# Patient Record
Sex: FEMALE | Race: OTHER | NOT HISPANIC OR LATINO
[De-identification: names, ages, dates, MRNs, and addresses within clinical notes are randomized per-mention and may not be internally consistent; named-entity substitution may affect disease eponyms.]

---

## 2017-09-25 ENCOUNTER — APPOINTMENT (OUTPATIENT)
Dept: ALLERGY | Facility: CLINIC | Age: 37
End: 2017-09-25
Payer: COMMERCIAL

## 2017-09-25 VITALS
DIASTOLIC BLOOD PRESSURE: 80 MMHG | WEIGHT: 145 LBS | HEART RATE: 80 BPM | SYSTOLIC BLOOD PRESSURE: 110 MMHG | HEIGHT: 64 IN | BODY MASS INDEX: 24.75 KG/M2 | RESPIRATION RATE: 14 BRPM

## 2017-09-25 PROCEDURE — 95018 ALL TSTG PERQ&IQ DRUGS/BIOL: CPT

## 2017-09-25 PROCEDURE — 95004 PERQ TESTS W/ALRGNC XTRCS: CPT

## 2017-09-25 PROCEDURE — 99204 OFFICE O/P NEW MOD 45 MIN: CPT | Mod: 25

## 2017-10-04 ENCOUNTER — APPOINTMENT (OUTPATIENT)
Dept: ALLERGY | Facility: CLINIC | Age: 37
End: 2017-10-04
Payer: COMMERCIAL

## 2017-10-04 PROCEDURE — 99213 OFFICE O/P EST LOW 20 MIN: CPT | Mod: 25

## 2017-10-04 PROCEDURE — 95018 ALL TSTG PERQ&IQ DRUGS/BIOL: CPT

## 2017-10-04 PROCEDURE — 95024 IQ TESTS W/ALLERGENIC XTRCS: CPT

## 2017-10-05 VITALS
BODY MASS INDEX: 24.75 KG/M2 | RESPIRATION RATE: 14 BRPM | HEART RATE: 72 BPM | HEIGHT: 64 IN | SYSTOLIC BLOOD PRESSURE: 120 MMHG | WEIGHT: 145 LBS | DIASTOLIC BLOOD PRESSURE: 80 MMHG

## 2017-10-18 ENCOUNTER — MESSAGE (OUTPATIENT)
Age: 37
End: 2017-10-18

## 2017-10-24 ENCOUNTER — EMERGENCY (EMERGENCY)
Facility: HOSPITAL | Age: 37
LOS: 1 days | Discharge: LEFT BEFORE DISCHARGE | End: 2017-10-24
Admitting: EMERGENCY MEDICINE

## 2017-10-24 VITALS
TEMPERATURE: 98 F | OXYGEN SATURATION: 98 % | HEART RATE: 77 BPM | SYSTOLIC BLOOD PRESSURE: 110 MMHG | RESPIRATION RATE: 17 BRPM | DIASTOLIC BLOOD PRESSURE: 62 MMHG

## 2017-10-24 NOTE — ED ADULT TRIAGE NOTE - CHIEF COMPLAINT QUOTE
pt states she woke up dizzy this morning. states the dizziness make her feel like she is going to fall.

## 2018-11-05 ENCOUNTER — EMERGENCY (EMERGENCY)
Facility: HOSPITAL | Age: 38
LOS: 1 days | Discharge: HOME | End: 2018-11-05
Attending: EMERGENCY MEDICINE | Admitting: EMERGENCY MEDICINE
Payer: COMMERCIAL

## 2018-11-05 VITALS
SYSTOLIC BLOOD PRESSURE: 101 MMHG | TEMPERATURE: 98 F | DIASTOLIC BLOOD PRESSURE: 46 MMHG | RESPIRATION RATE: 16 BRPM | HEART RATE: 63 BPM | OXYGEN SATURATION: 100 %

## 2018-11-05 VITALS
DIASTOLIC BLOOD PRESSURE: 48 MMHG | SYSTOLIC BLOOD PRESSURE: 107 MMHG | HEART RATE: 62 BPM | TEMPERATURE: 97 F | OXYGEN SATURATION: 100 % | RESPIRATION RATE: 18 BRPM

## 2018-11-05 PROCEDURE — 99284 EMERGENCY DEPT VISIT MOD MDM: CPT | Mod: 25

## 2018-11-05 RX ORDER — ONDANSETRON 8 MG/1
8 TABLET, FILM COATED ORAL ONCE
Qty: 0 | Refills: 0 | Status: COMPLETED | OUTPATIENT
Start: 2018-11-05 | End: 2018-11-05

## 2018-11-05 RX ORDER — SODIUM CHLORIDE 9 MG/ML
1000 INJECTION INTRAMUSCULAR; INTRAVENOUS; SUBCUTANEOUS ONCE
Qty: 0 | Refills: 0 | Status: COMPLETED | OUTPATIENT
Start: 2018-11-05 | End: 2018-11-05

## 2018-11-05 RX ORDER — ACETAMINOPHEN 500 MG
975 TABLET ORAL ONCE
Qty: 0 | Refills: 0 | Status: COMPLETED | OUTPATIENT
Start: 2018-11-05 | End: 2018-11-05

## 2018-11-05 RX ADMIN — Medication 975 MILLIGRAM(S): at 11:29

## 2018-11-05 RX ADMIN — ONDANSETRON 8 MILLIGRAM(S): 8 TABLET, FILM COATED ORAL at 08:49

## 2018-11-05 RX ADMIN — Medication 30 MILLILITER(S): at 08:49

## 2018-11-05 RX ADMIN — SODIUM CHLORIDE 1000 MILLILITER(S): 9 INJECTION INTRAMUSCULAR; INTRAVENOUS; SUBCUTANEOUS at 08:49

## 2018-11-05 RX ADMIN — SODIUM CHLORIDE 1000 MILLILITER(S): 9 INJECTION INTRAMUSCULAR; INTRAVENOUS; SUBCUTANEOUS at 09:56

## 2018-11-05 NOTE — ED PROVIDER NOTE - OBJECTIVE STATEMENT
38y healthy F with surgical hx of  presents with 1 morning of nausea, vomiting, and loose watery diarrhea. No blood in vomit or diarrhea. Pt also c/o vague diffuse abd pain. No localization. Works as physician at [employer]. States drank "holy water in [place]". Denies sick contact. No recent travel

## 2018-11-05 NOTE — ED ADULT NURSE NOTE - NSIMPLEMENTINTERV_GEN_ALL_ED
Implemented All Universal Safety Interventions:  [place] to call system. Call bell, personal items and telephone within reach. Instruct patient to call for assistance. Room bathroom lighting operational. Non-slip footwear when patient is off stretcher. Physically safe environment: no spills, clutter or unnecessary equipment. Stretcher in lowest position, wheels locked, appropriate side rails in place.

## 2018-11-05 NOTE — ED ADULT NURSE NOTE - OBJECTIVE STATEMENT
Patient reports abdominal pain, Nausea/vomiting since 5 am today. Patient denies any diarrhea, blood in stool, dysuria or hematuria. Patient's ucg currently in progress. Patient reports 3 episodes of emesis, patient reports emesis as food ingested. Patient's diastolic hypotensive, systolic normotensive. Patient receiving IVF through #20g inserted into right hand. Patient also given 8 mg IV Zofran and Maalox as per MD order. Patient to be reassessed.

## 2018-11-05 NOTE — ED ADULT NURSE NOTE - CHPI ED NUR SYMPTOMS NEG
no abdominal distension/no blood in stool/no burning urination/no chills/no hematuria/no dysuria/no fever/no diarrhea

## 2020-06-24 ENCOUNTER — APPOINTMENT (OUTPATIENT)
Dept: GASTROENTEROLOGY | Facility: CLINIC | Age: 40
End: 2020-06-24

## 2020-06-26 ENCOUNTER — APPOINTMENT (OUTPATIENT)
Dept: CARDIOLOGY | Facility: CLINIC | Age: 40
End: 2020-06-26
Payer: COMMERCIAL

## 2020-06-26 VITALS
DIASTOLIC BLOOD PRESSURE: 70 MMHG | HEART RATE: 61 BPM | SYSTOLIC BLOOD PRESSURE: 100 MMHG | OXYGEN SATURATION: 99 % | TEMPERATURE: 97.9 F | HEIGHT: 64 IN | BODY MASS INDEX: 23.9 KG/M2 | WEIGHT: 140 LBS

## 2020-06-26 DIAGNOSIS — Z83.438 FAMILY HISTORY OF OTHER DISORDER OF LIPOPROTEIN METABOLISM AND OTHER LIPIDEMIA: ICD-10-CM

## 2020-06-26 DIAGNOSIS — Z71.89 OTHER SPECIFIED COUNSELING: ICD-10-CM

## 2020-06-26 DIAGNOSIS — R07.9 CHEST PAIN, UNSPECIFIED: ICD-10-CM

## 2020-06-26 DIAGNOSIS — R06.00 DYSPNEA, UNSPECIFIED: ICD-10-CM

## 2020-06-26 DIAGNOSIS — R00.2 PALPITATIONS: ICD-10-CM

## 2020-06-26 PROCEDURE — 93000 ELECTROCARDIOGRAM COMPLETE: CPT

## 2020-06-26 PROCEDURE — 93306 TTE W/DOPPLER COMPLETE: CPT

## 2020-06-26 PROCEDURE — 99204 OFFICE O/P NEW MOD 45 MIN: CPT

## 2020-06-26 RX ORDER — FLUTICASONE PROPIONATE 50 UG/1
SPRAY, METERED NASAL
Refills: 0 | Status: DISCONTINUED | COMMUNITY
End: 2020-06-26

## 2020-06-26 NOTE — PHYSICAL EXAM
[General Appearance - Well Developed] : well developed [Normal Appearance] : normal appearance [Well Groomed] : well groomed [General Appearance - Well Nourished] : well nourished [No Deformities] : no deformities [General Appearance - In No Acute Distress] : no acute distress [Normal Conjunctiva] : the conjunctiva exhibited no abnormalities [Eyelids - No Xanthelasma] : the eyelids demonstrated no xanthelasmas [Normal Oral Mucosa] : normal oral mucosa [No Oral Pallor] : no oral pallor [No Oral Cyanosis] : no oral cyanosis [Normal Jugular Venous A Waves Present] : normal jugular venous A waves present [Normal Jugular Venous V Waves Present] : normal jugular venous V waves present [No Jugular Venous Shields A Waves] : no jugular venous shields A waves [Heart Rate And Rhythm] : heart rate and rhythm were normal [Heart Sounds] : normal S1 and S2 [Respiration, Rhythm And Depth] : normal respiratory rhythm and effort [Murmurs] : no murmurs present [Auscultation Breath Sounds / Voice Sounds] : lungs were clear to auscultation bilaterally [Exaggerated Use Of Accessory Muscles For Inspiration] : no accessory muscle use [Abdomen Tenderness] : non-tender [Abdomen Soft] : soft [Abdomen Mass (___ Cm)] : no abdominal mass palpated [Gait - Sufficient For Exercise Testing] : the gait was sufficient for exercise testing [Abnormal Walk] : normal gait [Nail Clubbing] : no clubbing of the fingernails [Cyanosis, Localized] : no localized cyanosis [Petechial Hemorrhages (___cm)] : no petechial hemorrhages [Skin Color & Pigmentation] : normal skin color and pigmentation [No Venous Stasis] : no venous stasis [No Skin Ulcers] : no skin ulcer [] : no rash [Skin Lesions] : no skin lesions [No Xanthoma] : no  xanthoma was observed [Oriented To Time, Place, And Person] : oriented to person, place, and time [Affect] : the affect was normal [No Anxiety] : not feeling anxious [Mood] : the mood was normal

## 2020-06-26 NOTE — PHYSICAL EXAM
[General Appearance - Well Developed] : well developed [Normal Appearance] : normal appearance [Well Groomed] : well groomed [General Appearance - Well Nourished] : well nourished [No Deformities] : no deformities [General Appearance - In No Acute Distress] : no acute distress [Normal Conjunctiva] : the conjunctiva exhibited no abnormalities [Eyelids - No Xanthelasma] : the eyelids demonstrated no xanthelasmas [Normal Oral Mucosa] : normal oral mucosa [No Oral Pallor] : no oral pallor [No Oral Cyanosis] : no oral cyanosis [Normal Jugular Venous A Waves Present] : normal jugular venous A waves present [Normal Jugular Venous V Waves Present] : normal jugular venous V waves present [No Jugular Venous Shields A Waves] : no jugular venous shields A waves [Heart Sounds] : normal S1 and S2 [Heart Rate And Rhythm] : heart rate and rhythm were normal [Respiration, Rhythm And Depth] : normal respiratory rhythm and effort [Murmurs] : no murmurs present [Exaggerated Use Of Accessory Muscles For Inspiration] : no accessory muscle use [Auscultation Breath Sounds / Voice Sounds] : lungs were clear to auscultation bilaterally [Abdomen Mass (___ Cm)] : no abdominal mass palpated [Abdomen Soft] : soft [Abdomen Tenderness] : non-tender [Abnormal Walk] : normal gait [Gait - Sufficient For Exercise Testing] : the gait was sufficient for exercise testing [Nail Clubbing] : no clubbing of the fingernails [Petechial Hemorrhages (___cm)] : no petechial hemorrhages [Cyanosis, Localized] : no localized cyanosis [Skin Color & Pigmentation] : normal skin color and pigmentation [No Venous Stasis] : no venous stasis [] : no rash [No Skin Ulcers] : no skin ulcer [Skin Lesions] : no skin lesions [No Xanthoma] : no  xanthoma was observed [Oriented To Time, Place, And Person] : oriented to person, place, and time [No Anxiety] : not feeling anxious [Mood] : the mood was normal [Affect] : the affect was normal

## 2020-06-26 NOTE — HISTORY OF PRESENT ILLNESS
[FreeTextEntry1] : Eric is a 41yo female with no known PMH presents today for evaluation of chest pain. She reports today that she has been experiencing chest pain since March. 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 Patient states that she experiences  the chest pain daily and it is intermittent. It is not worse with exertion. She has also been experiencing palpitations and dyspnea. These symptoms of chest pain, dyspnea and palpitations do always occur together. She has checked her vital signs at home and states her heart rate and pulse ox are always wnl. Chest pain has not improved or worsened since March.

## 2020-07-02 LAB
ALBUMIN SERPL ELPH-MCNC: 4.8 G/DL
ALP BLD-CCNC: 46 U/L
ALT SERPL-CCNC: 24 U/L
ANION GAP SERPL CALC-SCNC: 14 MMOL/L
AST SERPL-CCNC: 23 U/L
BASOPHILS # BLD AUTO: 0.02 K/UL
BASOPHILS NFR BLD AUTO: 0.3 %
BILIRUB SERPL-MCNC: 0.4 MG/DL
BUN SERPL-MCNC: 7 MG/DL
CALCIUM SERPL-MCNC: 10 MG/DL
CHLORIDE SERPL-SCNC: 104 MMOL/L
CHOLEST SERPL-MCNC: 208 MG/DL
CHOLEST/HDLC SERPL: 3.3 RATIO
CO2 SERPL-SCNC: 26 MMOL/L
CREAT SERPL-MCNC: 0.81 MG/DL
DEPRECATED D DIMER PPP IA-ACNC: <150 NG/ML DDU
EOSINOPHIL # BLD AUTO: 0.11 K/UL
EOSINOPHIL NFR BLD AUTO: 1.8 %
ESTIMATED AVERAGE GLUCOSE: 100 MG/DL
GLUCOSE SERPL-MCNC: 103 MG/DL
HBA1C MFR BLD HPLC: 5.1 %
HCT VFR BLD CALC: 41.2 %
HDLC SERPL-MCNC: 62 MG/DL
HGB BLD-MCNC: 13.4 G/DL
IMM GRANULOCYTES NFR BLD AUTO: 0.3 %
LDLC SERPL CALC-MCNC: 122 MG/DL
LYMPHOCYTES # BLD AUTO: 2.48 K/UL
LYMPHOCYTES NFR BLD AUTO: 41.1 %
MAN DIFF?: NORMAL
MCHC RBC-ENTMCNC: 29.8 PG
MCHC RBC-ENTMCNC: 32.5 GM/DL
MCV RBC AUTO: 91.8 FL
MONOCYTES # BLD AUTO: 0.33 K/UL
MONOCYTES NFR BLD AUTO: 5.5 %
NEUTROPHILS # BLD AUTO: 3.08 K/UL
NEUTROPHILS NFR BLD AUTO: 51 %
PLATELET # BLD AUTO: 235 K/UL
POTASSIUM SERPL-SCNC: 4.5 MMOL/L
PROT SERPL-MCNC: 7.4 G/DL
RBC # BLD: 4.49 M/UL
RBC # FLD: 13.4 %
SARS-COV-2 IGG SERPL IA-ACNC: 31.8 INDEX
SARS-COV-2 IGG SERPL QL IA: POSITIVE
SODIUM SERPL-SCNC: 143 MMOL/L
T4 FREE SERPL-MCNC: 1.2 NG/DL
TRIGL SERPL-MCNC: 116 MG/DL
TROPONIN-T, HIGH SENSITIVITY: <6 NG/L
TSH SERPL-ACNC: 1.5 UIU/ML
WBC # FLD AUTO: 6.04 K/UL

## 2020-07-08 ENCOUNTER — NON-APPOINTMENT (OUTPATIENT)
Age: 40
End: 2020-07-08

## 2020-07-21 PROCEDURE — 93224 XTRNL ECG REC UP TO 48 HRS: CPT

## 2020-08-05 ENCOUNTER — APPOINTMENT (OUTPATIENT)
Dept: CARDIOLOGY | Facility: CLINIC | Age: 40
End: 2020-08-05

## 2020-08-05 ENCOUNTER — RESULT REVIEW (OUTPATIENT)
Age: 40
End: 2020-08-05

## 2020-09-22 ENCOUNTER — APPOINTMENT (OUTPATIENT)
Dept: CARDIOLOGY | Facility: CLINIC | Age: 40
End: 2020-09-22
Payer: COMMERCIAL

## 2020-09-22 PROCEDURE — 93351 STRESS TTE COMPLETE: CPT

## 2020-10-25 ENCOUNTER — EMERGENCY (EMERGENCY)
Facility: HOSPITAL | Age: 40
LOS: 1 days | Discharge: HOME | End: 2020-10-25
Attending: EMERGENCY MEDICINE | Admitting: EMERGENCY MEDICINE
Payer: COMMERCIAL

## 2020-10-25 VITALS
RESPIRATION RATE: 18 BRPM | SYSTOLIC BLOOD PRESSURE: 97 MMHG | DIASTOLIC BLOOD PRESSURE: 49 MMHG | HEIGHT: 65 IN | OXYGEN SATURATION: 100 % | HEART RATE: 64 BPM | TEMPERATURE: 98 F

## 2020-10-25 VITALS — HEART RATE: 65 BPM | OXYGEN SATURATION: 100 % | RESPIRATION RATE: 20 BRPM | TEMPERATURE: 98 F

## 2020-10-25 LAB
ALBUMIN SERPL ELPH-MCNC: 4.1 G/DL — SIGNIFICANT CHANGE UP (ref 3.3–5)
ALP SERPL-CCNC: 42 U/L — SIGNIFICANT CHANGE UP (ref 40–120)
ALT FLD-CCNC: 11 U/L — SIGNIFICANT CHANGE UP (ref 4–33)
ANION GAP SERPL CALC-SCNC: 9 MMO/L — SIGNIFICANT CHANGE UP (ref 7–14)
AST SERPL-CCNC: 23 U/L — SIGNIFICANT CHANGE UP (ref 4–32)
BASOPHILS # BLD AUTO: 0.02 K/UL — SIGNIFICANT CHANGE UP (ref 0–0.2)
BASOPHILS NFR BLD AUTO: 0.3 % — SIGNIFICANT CHANGE UP (ref 0–2)
BILIRUB SERPL-MCNC: 0.3 MG/DL — SIGNIFICANT CHANGE UP (ref 0.2–1.2)
BUN SERPL-MCNC: 8 MG/DL — SIGNIFICANT CHANGE UP (ref 7–23)
CALCIUM SERPL-MCNC: 8.7 MG/DL — SIGNIFICANT CHANGE UP (ref 8.4–10.5)
CHLORIDE SERPL-SCNC: 105 MMOL/L — SIGNIFICANT CHANGE UP (ref 98–107)
CO2 SERPL-SCNC: 26 MMOL/L — SIGNIFICANT CHANGE UP (ref 22–31)
CREAT SERPL-MCNC: 0.64 MG/DL — SIGNIFICANT CHANGE UP (ref 0.5–1.3)
EOSINOPHIL # BLD AUTO: 0.15 K/UL — SIGNIFICANT CHANGE UP (ref 0–0.5)
EOSINOPHIL NFR BLD AUTO: 2.4 % — SIGNIFICANT CHANGE UP (ref 0–6)
GLUCOSE SERPL-MCNC: 101 MG/DL — HIGH (ref 70–99)
HCG SERPL-ACNC: < 5 MIU/ML — SIGNIFICANT CHANGE UP
HCT VFR BLD CALC: 35.2 % — SIGNIFICANT CHANGE UP (ref 34.5–45)
HGB BLD-MCNC: 11.8 G/DL — SIGNIFICANT CHANGE UP (ref 11.5–15.5)
IMM GRANULOCYTES NFR BLD AUTO: 0.2 % — SIGNIFICANT CHANGE UP (ref 0–1.5)
LYMPHOCYTES # BLD AUTO: 2.63 K/UL — SIGNIFICANT CHANGE UP (ref 1–3.3)
LYMPHOCYTES # BLD AUTO: 42.1 % — SIGNIFICANT CHANGE UP (ref 13–44)
MCHC RBC-ENTMCNC: 29.1 PG — SIGNIFICANT CHANGE UP (ref 27–34)
MCHC RBC-ENTMCNC: 33.5 % — SIGNIFICANT CHANGE UP (ref 32–36)
MCV RBC AUTO: 86.9 FL — SIGNIFICANT CHANGE UP (ref 80–100)
MONOCYTES # BLD AUTO: 0.46 K/UL — SIGNIFICANT CHANGE UP (ref 0–0.9)
MONOCYTES NFR BLD AUTO: 7.4 % — SIGNIFICANT CHANGE UP (ref 2–14)
NEUTROPHILS # BLD AUTO: 2.98 K/UL — SIGNIFICANT CHANGE UP (ref 1.8–7.4)
NEUTROPHILS NFR BLD AUTO: 47.6 % — SIGNIFICANT CHANGE UP (ref 43–77)
NRBC # FLD: 0 K/UL — SIGNIFICANT CHANGE UP (ref 0–0)
PLATELET # BLD AUTO: 233 K/UL — SIGNIFICANT CHANGE UP (ref 150–400)
PMV BLD: 10.6 FL — SIGNIFICANT CHANGE UP (ref 7–13)
POTASSIUM SERPL-MCNC: 4 MMOL/L — SIGNIFICANT CHANGE UP (ref 3.5–5.3)
POTASSIUM SERPL-SCNC: 4 MMOL/L — SIGNIFICANT CHANGE UP (ref 3.5–5.3)
PROT SERPL-MCNC: 6.6 G/DL — SIGNIFICANT CHANGE UP (ref 6–8.3)
RBC # BLD: 4.05 M/UL — SIGNIFICANT CHANGE UP (ref 3.8–5.2)
RBC # FLD: 12.6 % — SIGNIFICANT CHANGE UP (ref 10.3–14.5)
SODIUM SERPL-SCNC: 140 MMOL/L — SIGNIFICANT CHANGE UP (ref 135–145)
TROPONIN T, HIGH SENSITIVITY: < 6 NG/L — SIGNIFICANT CHANGE UP (ref ?–14)
WBC # BLD: 6.25 K/UL — SIGNIFICANT CHANGE UP (ref 3.8–10.5)
WBC # FLD AUTO: 6.25 K/UL — SIGNIFICANT CHANGE UP (ref 3.8–10.5)

## 2020-10-25 PROCEDURE — 93010 ELECTROCARDIOGRAM REPORT: CPT | Mod: 76

## 2020-10-25 PROCEDURE — 71046 X-RAY EXAM CHEST 2 VIEWS: CPT | Mod: 26

## 2020-10-25 PROCEDURE — 99285 EMERGENCY DEPT VISIT HI MDM: CPT

## 2020-10-25 RX ORDER — SODIUM CHLORIDE 9 MG/ML
1000 INJECTION INTRAMUSCULAR; INTRAVENOUS; SUBCUTANEOUS ONCE
Refills: 0 | Status: COMPLETED | OUTPATIENT
Start: 2020-10-25 | End: 2020-10-25

## 2020-10-25 RX ADMIN — SODIUM CHLORIDE 1000 MILLILITER(S): 9 INJECTION INTRAMUSCULAR; INTRAVENOUS; SUBCUTANEOUS at 20:55

## 2020-10-25 NOTE — ED ADULT TRIAGE NOTE - CHIEF COMPLAINT QUOTE
pt coming from home, pt had a witnessed syncopal episode after feeling nausea.  pt has PMH:  hypotension.  pt c/o chest pressure.  pt received zofran 4mg, asa 162mg by EMS

## 2020-10-25 NOTE — ED PROVIDER NOTE - ATTENDING CONTRIBUTION TO CARE
I have personally performed a face to face bedside history and physical examination of this patient. I have discussed the history, examination, review of systems, assessment and plan of management with the resident. I have reviewed the electronic medical record and amended it to reflect my history, review of systems, physical exam, assessment and plan.    Brief HPI:  41 yo female with pmhx recent covid infection, presenting with syncopal episode and CP today.  Patient reports previous episodes of syncope.  Has had negative stress test and echo within last year.  Had holter showing pvc.   who saw event denies witnessed seizure like activity.     Vitals:   Reviewed    Exam:    GEN:  Non-toxic appearing, non-distressed, speaking full sentences, non-diaphoretic, AAOx3  HEENT:  NCAT, neck supple, EOMI, PERRLA, sclera anicteric, no conjunctival pallor or injection, no stridor, normal voice, no tonsillar exudate, uvula midline, tympanic membranes and external auditory canals normal appearing bilaterally   CV:  regular rhythm and rate, s1/s2 audible, no murmurs, rubs or gallops, peripheral pulses 2+ and symmetric  PULM:  non-labored respirations, lungs clear to auscultation bilaterally, no wheezes, crackles or rales  ABD:  non distended, non-tender, no rebound, no guarding, negative Ahn's sign, bowel sounds normal, no cvat  MSK:  no gross deformity, non-tender extremities and joints, range of motion grossly normal appearing, no extremity edema, extremities warm and well perfused   NEURO:  AAOx3, CN II-XII intact, motor 5/5 in upper and lower extremities bilaterally, sensation grossly intact in extremities and trunk, finger to nose testing wnl, no nystagmus, negative Romberg, no pronator drift, no gait deficit  SKIN:  warm, dry, no rash or vesicles     A/P:  41 yo female with pmhx recent covid infection, presenting with syncopal episode and CP today.  VSS.  Exam atraumatic, extremities well perfused, no focal neuro deficits.  EKG sinus with occasional pvc, no ischemic change or brugada, wpw, hocm, or prolonged qt.  Possible orthostatic syncope.  Patient with largely benign cardiac workup to this point.  Labs, cxr, supportive care.  Patient offered cdu stay for tele however expresses desire to go home if ED workup negative.

## 2020-10-25 NOTE — ED PROVIDER NOTE - OBJECTIVE STATEMENT
41 yo female with pmhx recent covid infection, presenting with syncopal episode and CP today. Patient worked during day, and went home and had dinner, began to feel lightheaded and nausea while sitting, got up to go to bathroom, had syncopal episode for 7 minutes while walking, then returned to baseline. was hypotensive with EMS when going from laying to sitting position, given 500cc IVF bolus, 162mg ASA, and zofran, brought to ED. Has had previous syncopal episodes in past, has had cardiac workups 3 mo ago, where echo, stress were negative. holter showed frequent pvcs. Also endorses midsternal CP nonradiating.    Denies N/V, fevers, sick contacts, orthopnea, LE swelling

## 2020-10-25 NOTE — ED ADULT NURSE NOTE - CAS DISCH ACCOMP BY
AVS reviewed with pt and given copy.  Pre-procedure instructions reviewed, including NPO orders,  needed, and medications to hold.  Pt verbalized understanding and declined having questions at this time.     Adrianna Florence, RN, BSN        Spouse/Discharged by Dr Osei/Significant other

## 2020-10-25 NOTE — ED PROVIDER NOTE - NSFOLLOWUPINSTRUCTIONS_ED_ALL_ED_FT
Activities as tolerated. Please encourage good oral and fluid intake. For pain, please take Motrin 400mg every 4 hours as needed, or Tylenol 650mg every 6 hours as needed.    Please see your primary care doctor within 24-48 hours for further management of your symptoms.  Please follow up with cardiology in one week for further evaluation of your symptoms.    Please seek emergent medical management if you have any worsening signs or symptoms, such as chest pain, difficulty breathing, loss of consciousness, or persistent vomiting.

## 2020-10-25 NOTE — ED PROVIDER NOTE - PATIENT PORTAL LINK FT
You can access the FollowMyHealth Patient Portal offered by [facility] by registering at the following website: [email or web address]. By joining [facility]’s FollowMyHealth portal, you will also be able to view your health information using other applications (apps) compatible with our system.

## 2020-10-25 NOTE — ED PROVIDER NOTE - CLINICAL SUMMARY MEDICAL DECISION MAKING FREE TEXT BOX
39 yo female with pmhx recent covid infection, presenting with syncopal episode and CP today. suggestive of syncope 2/2 orthostatics vs arrhythmia. will eval of orthostatic hypotension vs acs vs arrhythmia with cbc cmp trop ekg cxr. will give ivf and will reassess. pt currently refuses pain medication.

## 2020-10-25 NOTE — ED ADULT NURSE NOTE - OBJECTIVE STATEMENT
Pt received c/o syncopal episode while sitting at dinner table this evening. Pt states she began to feel dizzy while eating dinner and then began to feel nauseous before passing out. Pt states that she began passing out regularly ever since she had COVid in March. Pt is vitally stable at this time, 20g PIV placed in L hand, @0g PIV in R hand in place on arrival. Pt safety maintianed, continue to monitor.

## 2020-10-25 NOTE — ED PROVIDER NOTE - PROGRESS NOTE DETAILS
Agus Osei PGY3  labs negs, cxr neg, tele showed occasional bigemeny, however HD and clinically stable. rec cardiac f/u urgent, pt HD and clinically stable for dc.

## 2021-03-03 ENCOUNTER — APPOINTMENT (OUTPATIENT)
Dept: UROGYNECOLOGY | Facility: CLINIC | Age: 41
End: 2021-03-03
Payer: COMMERCIAL

## 2021-03-03 VITALS
WEIGHT: 145 LBS | DIASTOLIC BLOOD PRESSURE: 78 MMHG | SYSTOLIC BLOOD PRESSURE: 114 MMHG | HEIGHT: 64 IN | BODY MASS INDEX: 24.75 KG/M2

## 2021-03-03 VITALS — TEMPERATURE: 97.7 F

## 2021-03-03 DIAGNOSIS — R39.15 URGENCY OF URINATION: ICD-10-CM

## 2021-03-03 DIAGNOSIS — N39.3 STRESS INCONTINENCE (FEMALE) (MALE): ICD-10-CM

## 2021-03-03 DIAGNOSIS — N39.0 URINARY TRACT INFECTION, SITE NOT SPECIFIED: ICD-10-CM

## 2021-03-03 LAB
BILIRUB UR QL STRIP: NORMAL
CLARITY UR: CLEAR
COLLECTION METHOD: NORMAL
GLUCOSE UR-MCNC: NORMAL
HCG UR QL: 0.2 EU/DL
HGB UR QL STRIP.AUTO: NORMAL
KETONES UR-MCNC: NORMAL
LEUKOCYTE ESTERASE UR QL STRIP: NORMAL
NITRITE UR QL STRIP: NORMAL
PH UR STRIP: 5.5
PROT UR STRIP-MCNC: NORMAL
SP GR UR STRIP: 1

## 2021-03-03 PROCEDURE — 51701 INSERT BLADDER CATHETER: CPT

## 2021-03-03 PROCEDURE — 99205 OFFICE O/P NEW HI 60 MIN: CPT | Mod: 25

## 2021-03-03 PROCEDURE — 99072 ADDL SUPL MATRL&STAF TM PHE: CPT

## 2021-03-03 RX ORDER — NITROFURANTOIN MACROCRYSTALS 50 MG/1
50 CAPSULE ORAL
Qty: 90 | Refills: 1 | Status: ACTIVE | COMMUNITY
Start: 2021-03-03 | End: 1900-01-01

## 2021-03-03 NOTE — HISTORY OF PRESENT ILLNESS
[Unable To Restrain Bowel Movement] : mild [Feelings Of Urinary Urgency] : moderate [Urinary Tract Infection] : moderate [] : years ago [de-identified] : wears maxi pads when running [de-identified] : with UTIs [de-identified] : with UTIs [FreeTextEntry1] : \par Eric presents with recurrent symptomatic UTIs since March 2020. She reports ~10 in the past 1 year. She reports ~3 urine cultures during this time, the other infections she self-treated. 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 She is sexually active, ~2 times per week. She reports UTI symptoms starting 3 days ago. \par \par Urine culture 8/20 >100K E Coli\par \par PMH: denies\par PSH: denies\par Social History: , nonsmoker, employed as hospitalist at [employer]

## 2021-03-03 NOTE — PHYSICAL EXAM
[Chaperone Present] : A chaperone was present in the examining room during all aspects of the physical examination [None] : no CVA tenderness [Labia Majora] : were normal [Labia Minora] : were normal [Normal Appearance] : general appearance was normal [Normal] : no abnormalities [Exam Deferred] : was deferred [FreeTextEntry1] : General: Well, appearing. Alert and orientated. No acute distress\par HEENT: Normocephalic, atraumatic and extraocular muscles appear to be intact \par Neck: Full range of motion, no obvious lymphadenopathy, deformities, or masses noted \par Respiratory: Speaking in full sentences comfortably, normal work of breathing and no cough during visit\par Musculoskeletal: active full range of motion in extremities \par Extremities: No upper extremity edema noted\par Skin: no obvious rash or skin lesions\par Neuro: Orientated X 3, speech is fluent, normal rate\par  [Tenderness] : ~T no ~M abdominal tenderness observed [Distended] : not distended

## 2021-03-03 NOTE — DISCUSSION/SUMMARY
[FreeTextEntry1] : 1. Recurrent UTIs\par -Urine sent for micro UA and culture today\par -We reviewed methods of prophylaxis extensively. \par -Obtain urine culture results from 2020\par -Post-coital ppx with Nitrofurantoin 50 mg \par -Daily Probiotic\par -Daily Vit C 1000 mg\par -If UTI symptoms, try Cystex OTC first. If symptoms persist, must obtain urine culture prior to starting treatment\par -If continues to get UTIs despite ppx, will obtain further workup with renal sonogram and cystoscopy \par \par RTO in 3 mo for follow up, or sooner if issues arise.

## 2021-03-03 NOTE — REASON FOR VISIT
[Questionnaire Received] : Patient questionnaire received [Intake Form Reviewed] : Patient intake form with past medical history, surgical history, family history and social history reviewed today [Recurrent Urinary Infections] : recurrent urinary infections

## 2021-03-04 LAB
APPEARANCE: CLEAR
BACTERIA: NEGATIVE
BILIRUBIN URINE: NEGATIVE
BLOOD URINE: NEGATIVE
COLOR: COLORLESS
GLUCOSE QUALITATIVE U: NEGATIVE
HYALINE CASTS: 1 /LPF
KETONES URINE: NEGATIVE
LEUKOCYTE ESTERASE URINE: NEGATIVE
MICROSCOPIC-UA: NORMAL
NITRITE URINE: NEGATIVE
PH URINE: 6.5
PROTEIN URINE: NEGATIVE
RED BLOOD CELLS URINE: 0 /HPF
SPECIFIC GRAVITY URINE: 1.01
SQUAMOUS EPITHELIAL CELLS: 1 /HPF
UROBILINOGEN URINE: NORMAL
WHITE BLOOD CELLS URINE: 0 /HPF

## 2021-03-05 LAB — BACTERIA UR CULT: NORMAL

## 2021-04-28 ENCOUNTER — APPOINTMENT (OUTPATIENT)
Dept: MAMMOGRAPHY | Facility: IMAGING CENTER | Age: 41
End: 2021-04-28

## 2021-04-28 ENCOUNTER — OUTPATIENT (OUTPATIENT)
Dept: OUTPATIENT SERVICES | Facility: HOSPITAL | Age: 41
LOS: 1 days | End: 2021-04-28

## 2021-04-28 DIAGNOSIS — Z00.8 ENCOUNTER FOR OTHER GENERAL EXAMINATION: ICD-10-CM

## 2021-05-19 ENCOUNTER — APPOINTMENT (OUTPATIENT)
Dept: UROGYNECOLOGY | Facility: CLINIC | Age: 41
End: 2021-05-19

## 2021-07-01 NOTE — ED ADULT TRIAGE NOTE - HEART RATE (BEATS/MIN)
64 pt presents to ED with concern for retained cotton from qtip in right ear after cleaning it last night. Denies fever/chill/HA/dizziness/chest pain/palpitation/sob/abd pain/n/v/d/ black stool/bloody stool/urinary sxs

## 2021-07-13 ENCOUNTER — NON-APPOINTMENT (OUTPATIENT)
Age: 41
End: 2021-07-13

## 2021-07-13 ENCOUNTER — APPOINTMENT (OUTPATIENT)
Dept: ORTHOPEDIC SURGERY | Facility: CLINIC | Age: 41
End: 2021-07-13
Payer: COMMERCIAL

## 2021-07-13 VITALS — BODY MASS INDEX: 24.41 KG/M2 | HEIGHT: 64 IN | WEIGHT: 143 LBS

## 2021-07-13 DIAGNOSIS — S93.491A SPRAIN OF OTHER LIGAMENT OF RIGHT ANKLE, INITIAL ENCOUNTER: ICD-10-CM

## 2021-07-13 PROCEDURE — 99203 OFFICE O/P NEW LOW 30 MIN: CPT

## 2021-07-13 PROCEDURE — 73610 X-RAY EXAM OF ANKLE: CPT | Mod: RT

## 2021-07-13 PROCEDURE — 99072 ADDL SUPL MATRL&STAF TM PHE: CPT

## 2021-07-13 NOTE — CONSULT LETTER
[Dear  ___] : Dear  [unfilled], [Consult Letter:] : I had the pleasure of evaluating your patient, [unfilled]. [Please see my note below.] : Please see my note below. [Consult Closing:] : Thank you very much for allowing me to participate in the care of this patient.  If you have any questions, please do not hesitate to contact me. [Sincerely,] : Sincerely, [FreeTextEntry3] : Dr. Rio Cox \par \par

## 2021-07-13 NOTE — PHYSICAL EXAM
[de-identified] : Right Lower Extremity\par o Ankle :\par ¦ Inspection/Palpation : marked tenderness to palpation over the ATFL,  mild lateral swelling, no deformities\par ¦ Range of Motion : arc of motion full and painful in all planes\par ¦ Stability : no joint instability on provocative testing\par ¦ Strength : all muscles 5/5 with mild pain throughout\par o Muscle Bulk : no atrophy\par o Sensation : sensation intact to light touch\par o Skin : no skin lesions, no discoloration\par o Vascular Exam : no edema, no cyanosis, posterior tibialis and dorsalis pedis pulses normal \par \par o Foot:\par ¦ Inspection/Palpation : no tenderness to palpation, no swelling\par ¦ Range of Motion : arc of motion full and painless in all planes\par ¦ Stability : no joint instability on provocative testing\par ¦ Strength : all muscles 5/5\par \par Left Lower Extremity\par o Ankle :\par ¦ Inspection/Palpation : no tenderness to palpation, no swelling, no deformities\par ¦ Range of Motion : arc of motion full and painless in all planes\par ¦ Stability : no joint instability on provocative testing\par ¦ Strength : all muscles 5/5\par o Muscle Bulk : no atrophy\par o Sensation : sensation intact to light touch\par o Skin : no skin lesions, no discoloration\par o Vascular Exam : no edema, no cyanosis,  posterior tibialis and dorsalis pedis pulses normal \par o Special Tests: Anterior Drawer (-)  [de-identified] : o Right Ankle : AP, lateral and oblique views were obtained, there are no soft tissue abnormalities, no fractures, alignment is normal, normal appearing joint spaces, normal bone density, no bony lesions.\par \par

## 2021-07-13 NOTE — HISTORY OF PRESENT ILLNESS
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 She   presents to the office ambulating independently wearing supportive sneakers. Patient reports pain began on 07/11/2021. Patient reports tripping and inverting her ankle. She notes immediate onset of pain and difficulty ambulating.   denies hearing or feeling a snap, crack, pop or pull.  The patient describes the pain as a dull aching, and occasionally sharp pain localized to anterior-lateral aspect of the ankle that is intermittent in nature.  symptoms are exacerbated with weightbearing and any movement of the ankle.  Pain is alleviated with rest.  Patient reports instability and weakness. due to pain. Patient is taking NSAIDs for pain relief with mild to Moderate relief in Her symptoms. Patient denies any other complaints at this time.

## 2021-07-13 NOTE — DISCUSSION/SUMMARY
[de-identified] : The underlying pathophysiology was reviewed in great detail with the patient as well as the various treatment options, including ice, analgesics, NSAIDs, Physical therapy, steroid injections, immobilization, bracing. \par \par An AirCast was provided and fit in clinic today.  Discussed continued use for support, immobilization and pain relief. \par \par A home exercise sheet was given and discussed with the patient to follow.A Thera-Band was provided for exercise program.\par \par Discussed utilization of a supportive shoe or over the counter orthotics. \par \par FU 4-6 weeks. \par \par All questions were answered, all alternatives discussed and the patient is in complete agreement with that plan. Follow-up appointment as instructed. Any issues and the patient will call or come in sooner.

## 2021-07-15 ENCOUNTER — TRANSCRIPTION ENCOUNTER (OUTPATIENT)
Age: 41
End: 2021-07-15

## 2021-07-16 ENCOUNTER — APPOINTMENT (OUTPATIENT)
Dept: ORTHOPEDIC SURGERY | Facility: CLINIC | Age: 41
End: 2021-07-16

## 2021-12-01 ENCOUNTER — APPOINTMENT (OUTPATIENT)
Dept: MAMMOGRAPHY | Facility: IMAGING CENTER | Age: 41
End: 2021-12-01
Payer: COMMERCIAL

## 2021-12-01 ENCOUNTER — OUTPATIENT (OUTPATIENT)
Dept: OUTPATIENT SERVICES | Facility: HOSPITAL | Age: 41
LOS: 1 days | End: 2021-12-01
Payer: COMMERCIAL

## 2021-12-01 DIAGNOSIS — Z00.8 ENCOUNTER FOR OTHER GENERAL EXAMINATION: ICD-10-CM

## 2021-12-01 PROCEDURE — 77067 SCR MAMMO BI INCL CAD: CPT | Mod: 26

## 2021-12-01 PROCEDURE — 77063 BREAST TOMOSYNTHESIS BI: CPT

## 2021-12-01 PROCEDURE — 77067 SCR MAMMO BI INCL CAD: CPT

## 2021-12-01 PROCEDURE — 77063 BREAST TOMOSYNTHESIS BI: CPT | Mod: 26

## 2021-12-08 ENCOUNTER — OUTPATIENT (OUTPATIENT)
Dept: OUTPATIENT SERVICES | Facility: HOSPITAL | Age: 41
LOS: 1 days | End: 2021-12-08
Payer: COMMERCIAL

## 2021-12-08 DIAGNOSIS — Z20.828 CONTACT WITH AND (SUSPECTED) EXPOSURE TO OTHER VIRAL COMMUNICABLE DISEASES: ICD-10-CM

## 2021-12-08 LAB — SARS-COV-2 RNA SPEC QL NAA+PROBE: SIGNIFICANT CHANGE UP

## 2021-12-08 PROCEDURE — 87635 SARS-COV-2 COVID-19 AMP PRB: CPT

## 2021-12-22 ENCOUNTER — APPOINTMENT (OUTPATIENT)
Dept: ULTRASOUND IMAGING | Facility: IMAGING CENTER | Age: 41
End: 2021-12-22
Payer: COMMERCIAL

## 2021-12-22 ENCOUNTER — OUTPATIENT (OUTPATIENT)
Dept: OUTPATIENT SERVICES | Facility: HOSPITAL | Age: 41
LOS: 1 days | End: 2021-12-22
Payer: COMMERCIAL

## 2021-12-22 DIAGNOSIS — Z00.8 ENCOUNTER FOR OTHER GENERAL EXAMINATION: ICD-10-CM

## 2021-12-22 PROCEDURE — 76641 ULTRASOUND BREAST COMPLETE: CPT | Mod: 26,50

## 2021-12-22 PROCEDURE — 76641 ULTRASOUND BREAST COMPLETE: CPT

## 2022-01-04 ENCOUNTER — OUTPATIENT (OUTPATIENT)
Dept: OUTPATIENT SERVICES | Facility: HOSPITAL | Age: 42
LOS: 1 days | End: 2022-01-04
Payer: COMMERCIAL

## 2022-01-04 DIAGNOSIS — Z20.828 CONTACT WITH AND (SUSPECTED) EXPOSURE TO OTHER VIRAL COMMUNICABLE DISEASES: ICD-10-CM

## 2022-01-04 PROCEDURE — U0003: CPT

## 2022-01-04 PROCEDURE — U0005: CPT

## 2022-01-06 ENCOUNTER — OUTPATIENT (OUTPATIENT)
Dept: OUTPATIENT SERVICES | Facility: HOSPITAL | Age: 42
LOS: 1 days | End: 2022-01-06
Payer: COMMERCIAL

## 2022-01-06 DIAGNOSIS — Z20.828 CONTACT WITH AND (SUSPECTED) EXPOSURE TO OTHER VIRAL COMMUNICABLE DISEASES: ICD-10-CM

## 2022-01-06 LAB
B PERT DNA SPEC QL NAA+PROBE: SIGNIFICANT CHANGE UP
C PNEUM DNA SPEC QL NAA+PROBE: SIGNIFICANT CHANGE UP
FLUAV H1 2009 PAND RNA SPEC QL NAA+PROBE: SIGNIFICANT CHANGE UP
FLUAV H1 RNA SPEC QL NAA+PROBE: SIGNIFICANT CHANGE UP
FLUAV H3 RNA SPEC QL NAA+PROBE: SIGNIFICANT CHANGE UP
FLUAV SUBTYP SPEC NAA+PROBE: SIGNIFICANT CHANGE UP
FLUBV RNA SPEC QL NAA+PROBE: SIGNIFICANT CHANGE UP
HADV DNA SPEC QL NAA+PROBE: SIGNIFICANT CHANGE UP
HCOV PNL SPEC NAA+PROBE: SIGNIFICANT CHANGE UP
HMPV RNA SPEC QL NAA+PROBE: SIGNIFICANT CHANGE UP
HPIV1 RNA SPEC QL NAA+PROBE: SIGNIFICANT CHANGE UP
HPIV2 RNA SPEC QL NAA+PROBE: SIGNIFICANT CHANGE UP
HPIV3 RNA SPEC QL NAA+PROBE: SIGNIFICANT CHANGE UP
HPIV4 RNA SPEC QL NAA+PROBE: SIGNIFICANT CHANGE UP
RAPID RVP RESULT: DETECTED
RV+EV RNA SPEC QL NAA+PROBE: SIGNIFICANT CHANGE UP
SARS-COV-2 RNA SPEC QL NAA+PROBE: DETECTED

## 2022-01-06 PROCEDURE — 0225U NFCT DS DNA&RNA 21 SARSCOV2: CPT

## 2022-01-12 ENCOUNTER — APPOINTMENT (OUTPATIENT)
Dept: ULTRASOUND IMAGING | Facility: IMAGING CENTER | Age: 42
End: 2022-01-12
Payer: COMMERCIAL

## 2022-01-12 ENCOUNTER — OUTPATIENT (OUTPATIENT)
Dept: OUTPATIENT SERVICES | Facility: HOSPITAL | Age: 42
LOS: 1 days | End: 2022-01-12
Payer: COMMERCIAL

## 2022-01-12 ENCOUNTER — RESULT REVIEW (OUTPATIENT)
Age: 42
End: 2022-01-12

## 2022-01-12 DIAGNOSIS — N63.0 UNSPECIFIED LUMP IN UNSPECIFIED BREAST: ICD-10-CM

## 2022-01-12 PROCEDURE — 77065 DX MAMMO INCL CAD UNI: CPT | Mod: 26,LT

## 2022-01-12 PROCEDURE — 19084 BX BREAST ADD LESION US IMAG: CPT | Mod: LT

## 2022-01-12 PROCEDURE — 88305 TISSUE EXAM BY PATHOLOGIST: CPT

## 2022-01-12 PROCEDURE — 77065 DX MAMMO INCL CAD UNI: CPT

## 2022-01-12 PROCEDURE — 19083 BX BREAST 1ST LESION US IMAG: CPT | Mod: LT

## 2022-01-12 PROCEDURE — 88305 TISSUE EXAM BY PATHOLOGIST: CPT | Mod: 26

## 2022-01-12 PROCEDURE — 19083 BX BREAST 1ST LESION US IMAG: CPT

## 2022-01-12 PROCEDURE — 19084 BX BREAST ADD LESION US IMAG: CPT

## 2022-01-12 PROCEDURE — A4648: CPT

## 2022-01-14 LAB — SURGICAL PATHOLOGY STUDY: SIGNIFICANT CHANGE UP

## 2022-03-08 ENCOUNTER — NON-APPOINTMENT (OUTPATIENT)
Age: 42
End: 2022-03-08

## 2022-03-08 ENCOUNTER — APPOINTMENT (OUTPATIENT)
Dept: CARDIOLOGY | Facility: CLINIC | Age: 42
End: 2022-03-08
Payer: COMMERCIAL

## 2022-03-08 VITALS
WEIGHT: 142 LBS | SYSTOLIC BLOOD PRESSURE: 110 MMHG | HEIGHT: 64 IN | OXYGEN SATURATION: 99 % | TEMPERATURE: 98.1 F | DIASTOLIC BLOOD PRESSURE: 70 MMHG | BODY MASS INDEX: 24.24 KG/M2 | HEART RATE: 70 BPM

## 2022-03-08 DIAGNOSIS — Z13.228 ENCOUNTER FOR SCREENING FOR OTHER METABOLIC DISORDERS: ICD-10-CM

## 2022-03-08 DIAGNOSIS — Z71.85 ENCOUNTER FOR IMMUNIZATION SAFETY COUNSELING: ICD-10-CM

## 2022-03-08 DIAGNOSIS — Z00.00 ENCOUNTER FOR GENERAL ADULT MEDICAL EXAMINATION W/OUT ABNORMAL FINDINGS: ICD-10-CM

## 2022-03-08 DIAGNOSIS — E55.9 VITAMIN D DEFICIENCY, UNSPECIFIED: ICD-10-CM

## 2022-03-08 DIAGNOSIS — Z12.39 ENCOUNTER FOR OTHER SCREENING FOR MALIGNANT NEOPLASM OF BREAST: ICD-10-CM

## 2022-03-08 PROCEDURE — 99396 PREV VISIT EST AGE 40-64: CPT

## 2022-03-08 PROCEDURE — 93000 ELECTROCARDIOGRAM COMPLETE: CPT

## 2022-03-08 NOTE — HISTORY OF PRESENT ILLNESS
[FreeTextEntry1] : This is a 42 year female who presents to the office for follow up and medical clearance\par \par pt reports feeling well. denies any complaints \par \par Pt denies any CP, SOB, heart palpitations, dizziness, abdominal pain N/V/D fever or chills\par \par pt also scheduled for abdominoplasty on 4/4/22 with Dr. Gabriel Mcduffie \par

## 2022-03-17 ENCOUNTER — NON-APPOINTMENT (OUTPATIENT)
Age: 42
End: 2022-03-17

## 2022-03-31 ENCOUNTER — TRANSCRIPTION ENCOUNTER (OUTPATIENT)
Age: 42
End: 2022-03-31

## 2022-04-25 ENCOUNTER — OUTPATIENT (OUTPATIENT)
Dept: OUTPATIENT SERVICES | Facility: HOSPITAL | Age: 42
LOS: 1 days | End: 2022-04-25
Payer: COMMERCIAL

## 2022-04-25 DIAGNOSIS — E22.1 HYPERPROLACTINEMIA: ICD-10-CM

## 2022-04-25 LAB — PROLACTIN SERPL-MCNC: 28.5 NG/ML — HIGH (ref 3.4–24.1)

## 2022-04-25 PROCEDURE — 84146 ASSAY OF PROLACTIN: CPT

## 2022-04-25 PROCEDURE — 36415 COLL VENOUS BLD VENIPUNCTURE: CPT

## 2022-04-28 ENCOUNTER — OUTPATIENT (OUTPATIENT)
Dept: OUTPATIENT SERVICES | Facility: HOSPITAL | Age: 42
LOS: 1 days | End: 2022-04-28
Payer: COMMERCIAL

## 2022-04-28 DIAGNOSIS — Z00.00 ENCOUNTER FOR GENERAL ADULT MEDICAL EXAMINATION WITHOUT ABNORMAL FINDINGS: ICD-10-CM

## 2022-04-29 PROCEDURE — 84146 ASSAY OF PROLACTIN: CPT

## 2022-06-15 NOTE — ED ADULT TRIAGE NOTE - SOURCE OF INFORMATION
Tami 45  Progress Note - Rosemarie Hudson 2/15/1931, 80 y o  male MRN: [id number]  Unit/Bed#: ICU 02 Encounter: [id number]  Primary Care Provider: Geraldo Hayward MD   Date and time admitted to hospital: 5/11/2022  4:48 PM    * Bowel perforation [facility])  Assessment & Plan  · Outpatient EGD and colonoscopy at [facility] on 5/11 for w/u of chronic anemia, history of colon polyps, intermittent LLQ abdominal pain and possible intermittent intussusception  · EGD unremarkable, colonoscopy technically difficult and required change from adult scope to pediatric scope, during traversal of the sigmoid colon there was a kink and patient sustained a perforation  · Transferred to [facility] for emergent surgery   · Emergent ex- lap on 5/11 > low anterior resection, protective loop transverse colostomy, flex sigmoidoscopy, and segmental small bowel resection  · Difficult post op course with post op ileus requiring NGT decompression and requirement of short duration of TPN   · 5/22: CT: Diffuse mild dilation of small bowel segments identified without transition point  · 5/24: CT CAP- Distended small bowel loops with scattered air-fluid levels consistent with early/partial small bowel obstruction with transition at the small bowel anastomosis in the region of the ventral pelvis as above  No free air  Cholelithiasis without cholecystitis  Left ventral loop colostomy with herniated fat stoma site    · 5/24: Stomal prolapse and small peristomal hernia now at the transverse loop colostomy; continues to be reduced by surgery  · TPN d/c'd on 5/26; tolerating tube feeds at goal  · 5/26: Abdominal wound vac placed bedside: continue with dressing changes Monday/Thursday   · Surgery continues to follow    · 6/6: Gastrostomy tube inserted for nutrition by IR   · 6/6: CT Chest Abdomen Pelvis: Indeterminant fluid collection in the right hemipelvis which appears to be loculated and possibly "walled off" from the remainder of abdominopelvic ascites  could represent an infected abscess, but is not significantly changed in size from May 24  · 6/8: IR abscess drainage and drain placement-30mL of pus drained +MRSA  · Continue to monitor output character and quantity    Acute respiratory failure with hypoxia ([facility] [address] )  Assessment & Plan  · Patient previously in ICU for hypoxia with a max of 15L midflow and concern for aspiration pneumonitis  · Transfered to general medical floor 5/21  · 5/21: PEA arrest x2, intubation  · 5/24 CT CAP-CHF with moderate basilar effusions and additional upper lung zone patchy airspace opacities likely reflecting asymmetric pulmonary edema  Infiltrate is not entirely excluded     · 5/29 Bronchoscopy for mucous plugging  · Day # 25 on ventilator: AC/PC 26/24/70%/6  · Wean Fio2/PEEP as able for SPO2>90%  · Continue pulmonary hygiene/ VAP bundle  · Chlorhexidine, PPI, HOB greater than 30 degrees   · Continue volume removal with CVVH as tolerated   · 6/10 IR L thora for 800 cc  · transudate by lights criteria  · No bacteria growth on culture and grain stain, fungal culture negative   · Surgery deemed unsafe for trach at this time given multi organ system failure/high vasopressor requirement, and tenuous respiratory status    Acute renal failure (ARF) ([facility])  Assessment & Plan  · Secondary to hypoperfusion mehul cardiac arrest +/- ATN  · Baseline creat 0 8  · Creatinine peaked at 3 07  · CVVH started on 5/26, attempts at [facility] unsuccessful secondary to rapidly escalating pressor requirements  · Avoid hypotension/hypoperfusion  · CRRT d/c last night after filter clotted at 1830  · No further dialysis per family discussions yesterday    Septic shock ([facility] [address] )  Assessment & Plan  Peritonitis with intra-abdominal/pelvic abscess secondary to colonic perforation    · 5/22 CT chest/ab/pelvis with concern of multifocal PNA, no visualized intraabdominal abscess or anastomotic leak   · Per ID suspect multifocal pneumonitis   · OR culture 5/21 pseudomonas and bacteroides fragilis  · Blood cultures on 5/22 negative   · Completed 14 days of Flagyl on 5/24  · Completed 14 days of cefepime on 5/27  · Restarted Zosyn on 6/3 for increasing WBC and oxygen requirements - discontinued 6/10  · Vanco started 6/9 for intra-abdominal abscess culture +MRSA  · Vanco day 7 - plan for 10 day course from date of drainage (6/19)  · ID following, appreciate recommendations   · IR drain in place, continue to monitor output   · WBC decreased to 17  · procal 0 9 from 1 88  · Hypothermia requiring chester hugger, continue to trend temps    CHF (congestive heart failure) ([facility] [address] )  Assessment & Plan  Wt Readings from Last 3 Encounters:   06/14/22 80 4 kg (177 lb 4 oz)   05/11/22 62 1 kg (136 lb 14 4 oz)   03/25/22 61 2 kg (135 lb)     · 5/16: Echo-EF 65%, mild TR, mild MR  · 5/23: Repeat Echo post cardiac arrest: EF 60-65%, G1DD, mild AS (BRADY 1 5cm2), mild MR, mild TR  · Monitor I&O, Daily weights   · Limited ECHO-EF 65%, G1DD, mild AS    Hyperglycemia  Assessment & Plan  · A1c 5 3%  · Hyperglycemia likely stress induced  · Continue SSI - minimal requirements     Toxic metabolic encephalopathy  Assessment & Plan  · Likely in setting of acute illness/delirium, end stage multi organ system failure  · Delirium precautions; regulate sleep/wake cycle, environmental controls, daily CAM ICU   · Trend neuro exam        Hypotension  Assessment & Plan  · Secondary to septic shock   · Continue midodrine 10mg TID    · Levo 2mcg, vaso 0 04 units  · No escalation of vasopressors per family discussions yesterday      Anemia  Assessment & Plan  · Hemoglobin drop post cardiac arrest    · Noted to have gross hematuria but this has since resolved  · 5/21: 1 u PRBC  · 5/24: 1 u PRBC  · CT obtained on 5/24 and negative for any overt signs of bleeding  · 5/26: 1 u PRBC   · 5/30: 1 u PRBC  · 6/4: 1 U PRBC   · 6/9: 1 U PRBC   · 6/11: 1 U PRBC       Paroxysmal atrial fibrillation (HCC)  Assessment & Plan  · In the setting of cardiac arrest while on 3 pressors noted to have afib with RVR  · Bolused with amio and placed on infusion  · Converted to sinus rhythm on 5/22   · Amio gtt d/c 5/23 but restarted on 6/7 due to RVR >100   · Pt now converted to  NSR, Amio gtt d/c 6/9  · Holding systemic AC with worsening thrombocytopenia     Thrombocytopenia (HCC)  Assessment & Plan  · HIT JAYLIN negative  · Argatroban d/c 6/9 and switch back to heparin gtt  · Thrombocytopenia likely in setting of bone marrow suppression from sepsis and CRRT  · Platelet count down to 25,000, helmet cells in hemolysis smear, INR normal, fibrinogen 146  · Concern for DIC  · Pre filter heparin held yesterday AM       ----------------------------------------------------------------------------------------  HPI/24hr events: Continued GOC discussions with family yesterday, plan for CRRT d/c once filter ran out and no escalation of care, no escalation of vasopressors  CRRT clotted at 1830 and was therefore d/c at that time  Levo 2mcg, vaso 0 04 units, patient normotensive on this regimen  K 5 4 last evening prior to stopped dialysis, treated with insulin 10 units and 1 amp d50  Repeat labs pending this morning        Patient appropriate for transfer out of the ICU today?: No  Disposition: Continue Critical Care   Code Status: Level 2 - DNAR: but accepts endotracheal intubation  ---------------------------------------------------------------------------------------  SUBJECTIVE    Review of Systems   Unable to perform ROS: Mental status change     Review of systems was unable to be performed secondary to encephalopathy  ---------------------------------------------------------------------------------------  OBJECTIVE    Vitals   Vitals:    06/14/22 2300 06/14/22 2357 06/15/22 0230 06/15/22 0256   BP: 108/52      BP Location:       Pulse: 87      Resp: (!) 40      Temp: 99 3 °F (37 4 °C)      TempSrc:       SpO2: 100% 100% 100% 100%   Weight: Height:         Temp (24hrs), Av 4 °F (36 3 °C), Min:96 62 °F (35 9 °C), Max:99 3 °F (37 4 °C)  Current: Temperature: 99 3 °F (37 4 °C)  Arterial Line BP: 133/36  Arterial Line MAP (mmHg): 74 mmHg    Respiratory:  SpO2: SpO2: 100 %, SpO2 Activity: SpO2 Activity: At Rest, SpO2 Device: O2 Device: Ventilator  Nasal Cannula O2 Flow Rate (L/min): 5 L/min    Invasive/non-invasive ventilation settings   Respiratory  Report   Lab Data (Last 4 hours)    None         O2/Vent Data (Last 4 hours)       2357 06/15 0256         Vent Mode AC/PC AC/PC      Resp Rate (BPM) (BPM) 26 26      Pressure Control (cmH2O) (cm) 24 24      Insp Time (sec) (sec) 0 5 0 5      FiO2 (%) (%) 70 70      PEEP (cmH2O) (cmH2O) 6 6      MV 11 10 3                  Physical Exam  Constitutional:       Appearance: He is ill-appearing and toxic-appearing  Interventions: He is intubated and restrained  HENT:      Head: Normocephalic and atraumatic  Eyes:      General: Lids are normal       Conjunctiva/sclera: Conjunctivae normal       Comments: Scleral edema    Neck:      Trachea: Trachea normal    Cardiovascular:      Rate and Rhythm: Normal rate and regular rhythm  Pulses: Normal pulses  Radial pulses are 2+ on the right side and 2+ on the left side  Dorsalis pedis pulses are 2+ on the right side and 2+ on the left side  Heart sounds: Normal heart sounds, S1 normal and S2 normal       Comments: anasarca  Pulmonary:      Effort: Tachypnea present  He is intubated  Breath sounds: Decreased breath sounds and rhonchi present  Abdominal:      General: Bowel sounds are normal       Palpations: Abdomen is soft  Comments: Midline abdominal incision with wound vac, small amount of serousang drainage, right abdominal abscess drainage with moderate amount of white mucoid drainage, G tube site intact    Genitourinary:     Comments: Scrotal edema  Musculoskeletal:      Cervical back: Neck supple  Right lower le+ Edema present  Left lower le+ Edema present  Comments: No extremity movement noted   Skin:     General: Skin is warm and dry  Capillary Refill: Capillary refill takes less than 2 seconds  Neurological:      GCS: GCS eye subscore is 1  GCS verbal subscore is 1  GCS motor subscore is 1  Laboratory and Diagnostics:  Results from last 7 days   Lab Units 22  0604 22  1906 22  0358 22  0559 22  0542 06/10/22  0601 22  1157 22  0533 22  0607   WBC Thousand/uL 17 23* 20 67* 19 48* 17 28* 16 88* 11 06* 12 13*   < > 15 34*   HEMOGLOBIN g/dL 7 7* 8 0* 8 4* 8 0* 6 8* 7 7* 8 2*   < > 7 1*   HEMATOCRIT % 25 3* 25 4* 26 8* 24 7* 20 7* 23 7* 25 9*   < > 22 9*   PLATELETS Thousands/uL 26* 25* 33* 46* 44* 85* 121*   < > 123*   NEUTROS PCT %  --   --  62  --   --   --   --   --   --    BANDS PCT % 10*  --   --   --  36* 18*  --   --  21*   MONOS PCT %  --   --  5  --   --   --   --   --   --    MONO PCT % 3*  --   --   --  2* 2*  --   --  7    < > = values in this interval not displayed       Results from last 7 days   Lab Units 22  1753 22  1155 22  0604 22  2359 22  1809 22  1253 22  0628 22  1157 22  0533 22  0002 22  1053   SODIUM mmol/L 137 136 137 137 136 137 137   < > 135*   < >  --    POTASSIUM mmol/L 5 6* 5 2 5 1 5 1 5 0 4 8 4 8   < > 3 6   < >  --    CHLORIDE mmol/L 103 104 104 105 103 104 104   < > 100   < >  --    CO2 mmol/L 29 28 27 27 29 28 26   < > 22   < >  --    ANION GAP mmol/L 5 4 6 5 4 5 7   < > 13   < >  --    BUN mg/dL 16 14 17 16 17 18 18   < > 25   < >  --    CREATININE mg/dL 0 86 0 91 0 89 0 85 0 88 0 88 0 90   < > 1 47*   < >  --    CALCIUM mg/dL 8 0* 8 2* 8 1* 8 1* 8 0* 8 0* 8 1*   < > 7 7*   < >  --    GLUCOSE RANDOM mg/dL 125 143* 141* 125 194* 155* 185*   < > 135   < >  --    ALT U/L  --   --   --   --   --   --   --   --  27  --  14   AST U/L  --   --   --   --   --   --   --   --  55*  --  29   ALK PHOS U/L  --   --   --   --   --   --   --   --  156*  --  191*   ALBUMIN g/dL  --   --   --   --   --   --   --   --  2 0*  --  2 2*   TOTAL BILIRUBIN mg/dL  --   --   --   --   --   --   --   --  1 77*  --  1 83*    < > = values in this interval not displayed  Results from last 7 days   Lab Units 06/14/22  1753 06/14/22  1155 06/14/22  0604 06/13/22  2359 06/13/22  1809 06/13/22  1253 06/13/22  0628   MAGNESIUM mg/dL 2 0 2 1 2 0 2 3 1 9 2 0 2 2   PHOSPHORUS mg/dL 3 8 3 4 3 3 3 4 3 4 3 1 3 1      Results from last 7 days   Lab Units 06/13/22  2054 06/11/22  0250 06/10/22  1917 06/10/22  0927 06/10/22  0136 06/09/22  1743 06/09/22  1157 06/09/22  1101 06/08/22  1300 06/08/22  1053   INR  1 28*  --   --   --   --   --  2 12*  --   --  1 87*   PTT seconds  --  112* 143* 210* >210* >210* 105* 85*   < > 201*    < > = values in this interval not displayed            Results from last 7 days   Lab Units 06/13/22  1906   LACTIC ACID mmol/L 0 8     ABG:  Results from last 7 days   Lab Units 06/14/22  1508   PH ART  7 248*   PCO2 ART mm Hg 54 4*   PO2 ART mm Hg 126 1   HCO3 ART mmol/L 23 2   BASE EXC ART mmol/L -4 1   ABG SOURCE  Line, Arterial     VBG:  Results from last 7 days   Lab Units 06/14/22  1508   ABG SOURCE  Line, Arterial     Results from last 7 days   Lab Units 06/14/22  0604 06/12/22  0559 06/09/22  0533   PROCALCITONIN ng/ml 0 92* 1 88* 3 92*       Micro  Results from last 7 days   Lab Units 06/10/22  1214 06/08/22  1555   GRAM STAIN RESULT  No Polys or Bacteria seen 2+ Gram positive cocci in pairs, chains and clusters*  No polys seen*   BODY FLUID CULTURE, STERILE  No growth 2+ Growth of Methicillin Resistant Staphylococcus aureus*       No new imaging     Intake and Output  I/O       06/13 0701  06/14 0700 06/14 0701  06/15 0700    I V  (mL/kg) 1647 (20 5) 438 (5 4)    NG/GT 70 220    IV Piggyback  120    Feedings 814     Total Intake(mL/kg) 2531 (31 5) 778 (9 7)    Urine (mL/kg/hr) 0 (0) 0 (0)    Emesis/NG output 0 0    Drains 115 75    Other 2699 1487    Stool 550 200    Total Output 9594 1762    Net -720 -441                Height and Weights   Height: 5' 4" (162 6 cm)  IBW (Ideal Body Weight): 59 2 kg  Body mass index is 30 42 kg/m²  Weight (last 2 days)     Date/Time Weight    06/14/22 0600 80 4 (177 25)    06/13/22 0600 81 9 (180 56)            Nutrition       Diet Orders   (From admission, onward)             Start     Ordered    06/10/22 1518  Diet Enteral/Parenteral; Tube Feeding No Oral Diet; Vital 1 5; Continuous; 50; Demond - Two Packets Daily  Diet effective now        References:    Nutrtion Support Algorithm Enteral vs  Parenteral   Question Answer Comment   Diet Type Enteral/Parenteral    Enteral/Parenteral Tube Feeding No Oral Diet    Tube Feeding Formula: Vital 1 5    Bolus/Cyclic/Continuous Continuous    Tube Feeding Goal Rate (mL/hr): 50    Demond Orange Demond - Two Packets Daily    RD to adjust diet per protocol?  Yes        06/10/22 1517    05/12/22 0906  Room Service  Once        Question:  Type of Service  Answer:  Room Service - Appropriate with Assistance    05/12/22 0905                  Active Medications  Scheduled Meds:  Current Facility-Administered Medications   Medication Dose Route Frequency Provider Last Rate    acetaminophen  650 mg Oral Q6H PRN ISELA Schneider      chlorhexidine  15 mL Mouth/Throat Q12H [address] ISELA Schneider      HYDROmorphone  0 5 mg Intravenous Q3H PRN ISELA Scott      insulin lispro  1-6 Units Subcutaneous Q6H [address] ISELA Covington      iohexol  50 mL Oral Once in imaging ISELA Schneider      ipratropium-albuterol  3 mL Nebulization Q6H ISELA Lawrence      levothyroxine  112 mcg Per NG Tube Early Morning Carry ISELA Bryant      midodrine  10 mg Oral TID DEVEN Duran, [address]      norepinephrine  1-30 mcg/min Intravenous Titrated Vevelyn Omar ISELA Mcgee 4 mcg/min (06/14/22 2327)    ondansetron  4 mg Intravenous Q6H PRN ISELA Phillip      oxyCODONE  5 mg Per NG Tube Q4H PRN ISELA Anderson      Or    oxyCODONE  7 5 mg Per NG Tube Q4H PRN ISELA Anderson      pantoprazole  40 mg Intravenous Q24H [facility] ISELA Phillip      polyethylene glycol  17 g Oral Daily ISELA Maxwell      potassium-sodium phosphates  1 packet Per G Tube Q8H Prakash Chamorro MD      vancomycin  15 mg/kg Intravenous Q24H Nagi Resendez PA-C      vasopressin (PITRESSIN) in 0 9 % sodium chloride 100 mL  0 04 Units/min Intravenous Continuous Bisi Stubbs PA-C 0 04 Units/min (06/14/22 2319)     Continuous Infusions:  norepinephrine, 1-30 mcg/min, Last Rate: 4 mcg/min (06/14/22 2327)  vasopressin (PITRESSIN) in 0 9 % sodium chloride 100 mL, 0 04 Units/min, Last Rate: 0 04 Units/min (06/14/22 2319)      PRN Meds:   acetaminophen, 650 mg, Q6H PRN  HYDROmorphone, 0 5 mg, Q3H PRN  iohexol, 50 mL, Once in imaging  ondansetron, 4 mg, Q6H PRN  oxyCODONE, 5 mg, Q4H PRN   Or  oxyCODONE, 7 5 mg, Q4H PRN        Invasive Devices Review  Invasive Devices  Report    Peripherally Inserted Central Catheter Line  Duration           PICC Line 82/48/90 Right Basilic 27 days          Arterial Line  Duration           Arterial Line 05/30/22 Radial 15 days          Hemodialysis Catheter  Duration           HD Temporary Double Catheter 19 days          Drain  Duration           Colostomy LLQ 35 days    Gastrostomy/Enterostomy Percutaneous Interventional Gastrostomy 16 Fr  LUQ 8 days    Abscess Drain Buttock 6 days          Airway  Duration           ETT  Oral 24 days                Rationale for remaining devices: continue HD catheter secondary to thrombocytopenia and risk for bleeding post removal  ---------------------------------------------------------------------------------------  Advance Directive and Living Will:      Power of :    POLST: ---------------------------------------------------------------------------------------  Care Time Delivered:   No Critical Care time spent       ISELA Madsen      Portions of the record may have been created with voice recognition software  Occasional wrong word or "sound a like" substitutions may have occurred due to the inherent limitations of voice recognition software    Read the chart carefully and recognize, using context, where substitutions have occurred Patient

## 2022-09-28 ENCOUNTER — EMERGENCY (EMERGENCY)
Facility: HOSPITAL | Age: 42
LOS: 1 days | Discharge: HOME | End: 2022-09-28
Attending: INTERNAL MEDICINE | Admitting: INTERNAL MEDICINE
Payer: COMMERCIAL

## 2022-09-28 VITALS
DIASTOLIC BLOOD PRESSURE: 61 MMHG | HEART RATE: 73 BPM | HEIGHT: 65 IN | SYSTOLIC BLOOD PRESSURE: 120 MMHG | WEIGHT: 143.3 LBS | TEMPERATURE: 98 F | RESPIRATION RATE: 16 BRPM | OXYGEN SATURATION: 100 %

## 2022-09-28 VITALS
TEMPERATURE: 98 F | RESPIRATION RATE: 16 BRPM | DIASTOLIC BLOOD PRESSURE: 63 MMHG | OXYGEN SATURATION: 100 % | SYSTOLIC BLOOD PRESSURE: 112 MMHG | HEART RATE: 68 BPM

## 2022-09-28 LAB
ALBUMIN SERPL ELPH-MCNC: 3.7 G/DL — SIGNIFICANT CHANGE UP (ref 3.3–5)
ALP SERPL-CCNC: 51 U/L — SIGNIFICANT CHANGE UP (ref 40–120)
ALT FLD-CCNC: 10 U/L — SIGNIFICANT CHANGE UP (ref 10–45)
ANION GAP SERPL CALC-SCNC: 7 MMOL/L — SIGNIFICANT CHANGE UP (ref 5–17)
APTT BLD: 28.5 SEC — SIGNIFICANT CHANGE UP (ref 27.5–35.5)
AST SERPL-CCNC: 9 U/L — LOW (ref 10–40)
BASOPHILS # BLD AUTO: 0.03 K/UL — SIGNIFICANT CHANGE UP (ref 0–0.2)
BASOPHILS NFR BLD AUTO: 0.5 % — SIGNIFICANT CHANGE UP (ref 0–2)
BILIRUB SERPL-MCNC: 0.2 MG/DL — SIGNIFICANT CHANGE UP (ref 0.2–1.2)
BUN SERPL-MCNC: 16 MG/DL — SIGNIFICANT CHANGE UP (ref 7–23)
CALCIUM SERPL-MCNC: 8.9 MG/DL — SIGNIFICANT CHANGE UP (ref 8.4–10.5)
CHLORIDE SERPL-SCNC: 103 MMOL/L — SIGNIFICANT CHANGE UP (ref 96–108)
CO2 SERPL-SCNC: 29 MMOL/L — SIGNIFICANT CHANGE UP (ref 22–31)
CREAT SERPL-MCNC: 0.92 MG/DL — SIGNIFICANT CHANGE UP (ref 0.5–1.3)
EGFR: 80 ML/MIN/1.73M2 — SIGNIFICANT CHANGE UP
EOSINOPHIL # BLD AUTO: 0.14 K/UL — SIGNIFICANT CHANGE UP (ref 0–0.5)
EOSINOPHIL NFR BLD AUTO: 2.3 % — SIGNIFICANT CHANGE UP (ref 0–6)
FLUAV AG NPH QL: SIGNIFICANT CHANGE UP
FLUBV AG NPH QL: SIGNIFICANT CHANGE UP
GLUCOSE SERPL-MCNC: 106 MG/DL — HIGH (ref 70–99)
HCT VFR BLD CALC: 36.8 % — SIGNIFICANT CHANGE UP (ref 34.5–45)
HGB BLD-MCNC: 12.3 G/DL — SIGNIFICANT CHANGE UP (ref 11.5–15.5)
IMM GRANULOCYTES NFR BLD AUTO: 0.2 % — SIGNIFICANT CHANGE UP (ref 0–0.9)
INR BLD: 0.92 RATIO — SIGNIFICANT CHANGE UP (ref 0.88–1.16)
LACTATE SERPL-SCNC: 0.6 MMOL/L — LOW (ref 0.7–2)
LYMPHOCYTES # BLD AUTO: 2.27 K/UL — SIGNIFICANT CHANGE UP (ref 1–3.3)
LYMPHOCYTES # BLD AUTO: 36.7 % — SIGNIFICANT CHANGE UP (ref 13–44)
MCHC RBC-ENTMCNC: 29.6 PG — SIGNIFICANT CHANGE UP (ref 27–34)
MCHC RBC-ENTMCNC: 33.4 GM/DL — SIGNIFICANT CHANGE UP (ref 32–36)
MCV RBC AUTO: 88.7 FL — SIGNIFICANT CHANGE UP (ref 80–100)
MONOCYTES # BLD AUTO: 0.64 K/UL — SIGNIFICANT CHANGE UP (ref 0–0.9)
MONOCYTES NFR BLD AUTO: 10.3 % — SIGNIFICANT CHANGE UP (ref 2–14)
NEUTROPHILS # BLD AUTO: 3.1 K/UL — SIGNIFICANT CHANGE UP (ref 1.8–7.4)
NEUTROPHILS NFR BLD AUTO: 50 % — SIGNIFICANT CHANGE UP (ref 43–77)
NRBC # BLD: 0 /100 WBCS — SIGNIFICANT CHANGE UP (ref 0–0)
PLATELET # BLD AUTO: 249 K/UL — SIGNIFICANT CHANGE UP (ref 150–400)
POTASSIUM SERPL-MCNC: 3.6 MMOL/L — SIGNIFICANT CHANGE UP (ref 3.5–5.3)
POTASSIUM SERPL-SCNC: 3.6 MMOL/L — SIGNIFICANT CHANGE UP (ref 3.5–5.3)
PROT SERPL-MCNC: 7.1 G/DL — SIGNIFICANT CHANGE UP (ref 6–8.3)
PROTHROM AB SERPL-ACNC: 10.7 SEC — SIGNIFICANT CHANGE UP (ref 10.5–13.4)
RAPID RVP RESULT: SIGNIFICANT CHANGE UP
RBC # BLD: 4.15 M/UL — SIGNIFICANT CHANGE UP (ref 3.8–5.2)
RBC # FLD: 13.2 % — SIGNIFICANT CHANGE UP (ref 10.3–14.5)
RSV RNA NPH QL NAA+NON-PROBE: SIGNIFICANT CHANGE UP
SARS-COV-2 RNA SPEC QL NAA+PROBE: SIGNIFICANT CHANGE UP
SARS-COV-2 RNA SPEC QL NAA+PROBE: SIGNIFICANT CHANGE UP
SODIUM SERPL-SCNC: 139 MMOL/L — SIGNIFICANT CHANGE UP (ref 135–145)
TROPONIN I, HIGH SENSITIVITY RESULT: 4.1 NG/L — SIGNIFICANT CHANGE UP
WBC # BLD: 6.19 K/UL — SIGNIFICANT CHANGE UP (ref 3.8–10.5)
WBC # FLD AUTO: 6.19 K/UL — SIGNIFICANT CHANGE UP (ref 3.8–10.5)

## 2022-09-28 PROCEDURE — 85730 THROMBOPLASTIN TIME PARTIAL: CPT

## 2022-09-28 PROCEDURE — 85025 COMPLETE CBC W/AUTO DIFF WBC: CPT

## 2022-09-28 PROCEDURE — 84484 ASSAY OF TROPONIN QUANT: CPT

## 2022-09-28 PROCEDURE — 36415 COLL VENOUS BLD VENIPUNCTURE: CPT

## 2022-09-28 PROCEDURE — 83605 ASSAY OF LACTIC ACID: CPT

## 2022-09-28 PROCEDURE — 93010 ELECTROCARDIOGRAM REPORT: CPT

## 2022-09-28 PROCEDURE — 80053 COMPREHEN METABOLIC PANEL: CPT

## 2022-09-28 PROCEDURE — 93005 ELECTROCARDIOGRAM TRACING: CPT

## 2022-09-28 PROCEDURE — 70450 CT HEAD/BRAIN W/O DYE: CPT | Mod: MA

## 2022-09-28 PROCEDURE — 96361 HYDRATE IV INFUSION ADD-ON: CPT

## 2022-09-28 PROCEDURE — 82962 GLUCOSE BLOOD TEST: CPT

## 2022-09-28 PROCEDURE — 87637 SARSCOV2&INF A&B&RSV AMP PRB: CPT

## 2022-09-28 PROCEDURE — 70450 CT HEAD/BRAIN W/O DYE: CPT | Mod: 26,MA

## 2022-09-28 PROCEDURE — 99285 EMERGENCY DEPT VISIT HI MDM: CPT | Mod: 25

## 2022-09-28 PROCEDURE — 99285 EMERGENCY DEPT VISIT HI MDM: CPT

## 2022-09-28 PROCEDURE — 96360 HYDRATION IV INFUSION INIT: CPT

## 2022-09-28 PROCEDURE — 85610 PROTHROMBIN TIME: CPT

## 2022-09-28 PROCEDURE — 0225U NFCT DS DNA&RNA 21 SARSCOV2: CPT

## 2022-09-28 RX ORDER — ACETAMINOPHEN 500 MG
975 TABLET ORAL ONCE
Refills: 0 | Status: COMPLETED | OUTPATIENT
Start: 2022-09-28 | End: 2022-09-28

## 2022-09-28 RX ORDER — SODIUM CHLORIDE 9 MG/ML
1000 INJECTION INTRAMUSCULAR; INTRAVENOUS; SUBCUTANEOUS
Refills: 0 | Status: COMPLETED | OUTPATIENT
Start: 2022-09-28 | End: 2022-09-28

## 2022-09-28 RX ORDER — MECLIZINE HCL 12.5 MG
50 TABLET ORAL ONCE
Refills: 0 | Status: COMPLETED | OUTPATIENT
Start: 2022-09-28 | End: 2022-09-28

## 2022-09-28 RX ORDER — IBUPROFEN 200 MG
1 TABLET ORAL
Qty: 40 | Refills: 0
Start: 2022-09-28 | End: 2022-10-07

## 2022-09-28 RX ORDER — SODIUM CHLORIDE 9 MG/ML
1000 INJECTION INTRAMUSCULAR; INTRAVENOUS; SUBCUTANEOUS
Refills: 0 | Status: DISCONTINUED | OUTPATIENT
Start: 2022-09-28 | End: 2022-10-01

## 2022-09-28 RX ORDER — MECLIZINE HCL 12.5 MG
1 TABLET ORAL
Qty: 21 | Refills: 0
Start: 2022-09-28 | End: 2022-10-04

## 2022-09-28 RX ORDER — KETOROLAC TROMETHAMINE 30 MG/ML
30 SYRINGE (ML) INJECTION ONCE
Refills: 0 | Status: COMPLETED | OUTPATIENT
Start: 2022-09-28 | End: 2022-09-28

## 2022-09-28 RX ADMIN — SODIUM CHLORIDE 1000 MILLILITER(S): 9 INJECTION INTRAMUSCULAR; INTRAVENOUS; SUBCUTANEOUS at 08:53

## 2022-09-28 RX ADMIN — SODIUM CHLORIDE 1000 MILLILITER(S): 9 INJECTION INTRAMUSCULAR; INTRAVENOUS; SUBCUTANEOUS at 10:25

## 2022-09-28 RX ADMIN — Medication 975 MILLIGRAM(S): at 08:53

## 2022-09-28 RX ADMIN — Medication 50 MILLIGRAM(S): at 08:53

## 2022-09-28 NOTE — ED PROVIDER NOTE - PATIENT PORTAL LINK FT
You can access the FollowMyHealth Patient Portal offered by [facility] by registering at the following website: [email or web address]. By joining [facility]’s FollowMyHealth portal, you will also be able to view your health information using other applications (apps) compatible with our system.

## 2022-09-28 NOTE — ED PROVIDER NOTE - CLINICAL SUMMARY MEDICAL DECISION MAKING FREE TEXT BOX
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[employer]  symptoms for 5 days   plan labs ekg - nsr at 67 ct brain cta head and neck 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

## 2022-09-28 NOTE — ED PROVIDER NOTE - NSFOLLOWUPINSTRUCTIONS_ED_ALL_ED_FT
Follow up with your PMD within 1-2 days.  Rest, increase your fluids, advance your activity as tolerated.   Take all of your other medications as previously prescribed.   Worsening, continued or ANY new concerning symptoms return to the emergency department.             BENIGN PAROXYSMAL POSITIONAL VERTIGO - Discharge Care           Benign Paroxysmal Positional Vertigo    WHAT YOU NEED TO KNOW:    BPPV is an inner ear condition that causes you to suddenly feel dizzy. Benign means it is not serious or life-threatening. BPPV is caused by a problem with the nerves and structure of your inner ear. BPPV happens when small pieces of calcium break loose and lump together in one of your inner ear canals.   Ear Anatomy         DISCHARGE INSTRUCTIONS:    Seek care immediately if:   •You fall during a BPPV episode and are injured.      •You have a severe headache that does not go away.      •You have new changes in your vision or feel weak or confused.      •You have problems hearing, or you have ringing or buzzing in your ears.      Contact your healthcare provider if:   •Your BPPV symptoms do not go away or they return.      •You have problems with your balance, or you are falling often.      •You have new or increased nausea or vomiting with vertigo.      •You feel anxious or depressed and do not want to leave your home.      •You have questions or concerns about your condition or care.      Medicines:   •Medicines may be recommended or prescribed to treat dizziness or nausea.      •Take your medicine as directed. Contact your healthcare provider if you think your medicine is not helping or if you have side effects. Tell your provider if you are allergic to any medicine. Keep a list of the medicines, vitamins, and herbs you take. Include the amounts, and when and why you take them. Bring the list or the pill bottles to follow-up visits. Carry your medicine list with you in case of an emergency.      Prevent your symptoms:   •Try to avoid sudden head movements. Stand up and lie down slowly.       •Raise and support your head when you lie down. Place pillows under your upper back and head or rest in a recliner.       •Change your position often when you are lying down. Try not to lie with your head on the same side for long periods of time. Roll over slowly.       •Wear protective gear when you ride a bike or play sports. A helmet helps protect your head from injury.      Follow up with your healthcare provider as directed: You may need to return in 1 month to check the progress of your treatment. Write down your questions so you remember to ask them during your visits.        © Copyright [employer] 2022           back to top                          © Copyright [employer] 2022

## 2022-09-28 NOTE — ED PROVIDER NOTE - NSICDXFAMILYHX_GEN_ALL_CORE_FT
FAMILY HISTORY:  Father  Still living? Unknown  Family history of MI (myocardial infarction), Age at diagnosis: 51-60

## 2022-09-28 NOTE — ED ADULT TRIAGE NOTE - CHIEF COMPLAINT QUOTE
Dizziness since Saturday c/o generalized body aches and feeling "foggy" this morning since she woke up at 0430.

## 2022-09-28 NOTE — ED ADULT TRIAGE NOTE - ARRIVAL INFO ADDITIONAL COMMENTS
Dr Jefferson evaluated in triage, code stroke not activated at this time, to CT from triage. Dr Jefferson evaluated in triage, code stroke not activated at this time.

## 2022-09-28 NOTE — ED PROVIDER NOTE - OBJECTIVE STATEMENT
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

## 2023-01-23 ENCOUNTER — APPOINTMENT (OUTPATIENT)
Dept: INTERNAL MEDICINE | Facility: CLINIC | Age: 43
End: 2023-01-23
Payer: COMMERCIAL

## 2023-01-23 ENCOUNTER — NON-APPOINTMENT (OUTPATIENT)
Age: 43
End: 2023-01-23

## 2023-01-23 VITALS
HEART RATE: 69 BPM | TEMPERATURE: 97.6 F | HEIGHT: 64 IN | OXYGEN SATURATION: 98 % | SYSTOLIC BLOOD PRESSURE: 118 MMHG | DIASTOLIC BLOOD PRESSURE: 80 MMHG | WEIGHT: 146 LBS | BODY MASS INDEX: 24.92 KG/M2

## 2023-01-23 DIAGNOSIS — Z01.818 ENCOUNTER FOR OTHER PREPROCEDURAL EXAMINATION: ICD-10-CM

## 2023-01-23 DIAGNOSIS — Z83.3 FAMILY HISTORY OF DIABETES MELLITUS: ICD-10-CM

## 2023-01-23 DIAGNOSIS — E78.9 DISORDER OF LIPOPROTEIN METABOLISM, UNSPECIFIED: ICD-10-CM

## 2023-01-23 DIAGNOSIS — Z82.49 FAMILY HISTORY OF ISCHEMIC HEART DISEASE AND OTHER DISEASES OF THE CIRCULATORY SYSTEM: ICD-10-CM

## 2023-01-23 PROCEDURE — 99204 OFFICE O/P NEW MOD 45 MIN: CPT | Mod: 25

## 2023-01-23 PROCEDURE — 93000 ELECTROCARDIOGRAM COMPLETE: CPT

## 2023-01-23 NOTE — PLAN
[FreeTextEntry1] : Pre-Op evaluation- Medically optimized, EKR reveals SR, will check labs today. If labs are within normal limits, patient may proceed ahead with surgery as planned.

## 2023-01-23 NOTE — PHYSICAL EXAM
[No Acute Distress] : no acute distress [Well Nourished] : well nourished [Normal Sclera/Conjunctiva] : normal sclera/conjunctiva [Normal Outer Ear/Nose] : the outer ears and nose were normal in appearance [No JVD] : no jugular venous distention [No Lymphadenopathy] : no lymphadenopathy [No Respiratory Distress] : no respiratory distress  [No Accessory Muscle Use] : no accessory muscle use [Normal Rate] : normal rate  [Regular Rhythm] : with a regular rhythm [Normal S1, S2] : normal S1 and S2 [No Edema] : there was no peripheral edema [Soft] : abdomen soft [Non Tender] : non-tender [No CVA Tenderness] : no CVA  tenderness [No Joint Swelling] : no joint swelling [Grossly Normal Strength/Tone] : grossly normal strength/tone [No Rash] : no rash

## 2023-01-23 NOTE — REVIEW OF SYSTEMS
[Fever] : no fever [Chills] : no chills [Discharge] : no discharge [Earache] : no earache [Nasal Discharge] : no nasal discharge [Chest Pain] : no chest pain [Palpitations] : no palpitations [Orthopnea] : no orthopnea [Shortness Of Breath] : no shortness of breath [Wheezing] : no wheezing [Abdominal Pain] : no abdominal pain [Nausea] : no nausea [Vomiting] : no vomiting [Dysuria] : no dysuria [Hematuria] : no hematuria [Joint Pain] : no joint pain [Muscle Pain] : no muscle pain [Itching] : no itching [Skin Rash] : no skin rash [Headache] : no headache

## 2023-01-23 NOTE — HISTORY OF PRESENT ILLNESS
[FreeTextEntry8] : 42 y.o. F here for pre-op evaluation for revision of abdominoplasty scheduled for monday 1/30/23.  She has no acute medical complaints.

## 2023-01-24 ENCOUNTER — NON-APPOINTMENT (OUTPATIENT)
Age: 43
End: 2023-01-24

## 2023-01-24 LAB
ALBUMIN SERPL ELPH-MCNC: 4.7 G/DL
ALP BLD-CCNC: 44 U/L
ALT SERPL-CCNC: 10 U/L
ANION GAP SERPL CALC-SCNC: 16 MMOL/L
APPEARANCE: CLEAR
APTT BLD: 30.3 SEC
AST SERPL-CCNC: 16 U/L
BASOPHILS # BLD AUTO: 0.02 K/UL
BASOPHILS NFR BLD AUTO: 0.3 %
BILIRUB DIRECT SERPL-MCNC: 0.1 MG/DL
BILIRUB INDIRECT SERPL-MCNC: 0.3 MG/DL
BILIRUB SERPL-MCNC: 0.4 MG/DL
BILIRUBIN URINE: NEGATIVE
BLOOD URINE: NEGATIVE
BUN SERPL-MCNC: 10 MG/DL
CALCIUM SERPL-MCNC: 9.9 MG/DL
CHLORIDE SERPL-SCNC: 103 MMOL/L
CHOLEST SERPL-MCNC: 237 MG/DL
CO2 SERPL-SCNC: 21 MMOL/L
COLOR: NORMAL
CREAT SERPL-MCNC: 0.76 MG/DL
EGFR: 100 ML/MIN/1.73M2
EOSINOPHIL # BLD AUTO: 0.06 K/UL
EOSINOPHIL NFR BLD AUTO: 0.9 %
ESTIMATED AVERAGE GLUCOSE: 105 MG/DL
GLUCOSE QUALITATIVE U: NEGATIVE
GLUCOSE SERPL-MCNC: 58 MG/DL
HBA1C MFR BLD HPLC: 5.3 %
HCG SERPL-MCNC: <1 MIU/ML
HCT VFR BLD CALC: 42.1 %
HDLC SERPL-MCNC: 67 MG/DL
HGB BLD-MCNC: 13.7 G/DL
IMM GRANULOCYTES NFR BLD AUTO: 0.2 %
INR PPP: 1.02 RATIO
KETONES URINE: ABNORMAL
LDLC SERPL CALC-MCNC: 155 MG/DL
LEUKOCYTE ESTERASE URINE: NEGATIVE
LYMPHOCYTES # BLD AUTO: 3.08 K/UL
LYMPHOCYTES NFR BLD AUTO: 48.1 %
MAN DIFF?: NORMAL
MCHC RBC-ENTMCNC: 29.5 PG
MCHC RBC-ENTMCNC: 32.5 GM/DL
MCV RBC AUTO: 90.5 FL
MONOCYTES # BLD AUTO: 0.39 K/UL
MONOCYTES NFR BLD AUTO: 6.1 %
NEUTROPHILS # BLD AUTO: 2.84 K/UL
NEUTROPHILS NFR BLD AUTO: 44.4 %
NITRITE URINE: NEGATIVE
NONHDLC SERPL-MCNC: 170 MG/DL
PH URINE: 7
PLATELET # BLD AUTO: 255 K/UL
POTASSIUM SERPL-SCNC: 4.4 MMOL/L
PROT SERPL-MCNC: 7.8 G/DL
PROTEIN URINE: NEGATIVE
PT BLD: 11.9 SEC
RBC # BLD: 4.65 M/UL
RBC # FLD: 12.8 %
SODIUM SERPL-SCNC: 139 MMOL/L
SPECIFIC GRAVITY URINE: 1.01
TRIGL SERPL-MCNC: 76 MG/DL
TSH SERPL-ACNC: 1.8 UIU/ML
UROBILINOGEN URINE: NORMAL
WBC # FLD AUTO: 6.4 K/UL

## 2023-03-06 ENCOUNTER — APPOINTMENT (OUTPATIENT)
Dept: MAMMOGRAPHY | Facility: CLINIC | Age: 43
End: 2023-03-06
Payer: COMMERCIAL

## 2023-03-06 ENCOUNTER — OUTPATIENT (OUTPATIENT)
Dept: OUTPATIENT SERVICES | Facility: HOSPITAL | Age: 43
LOS: 1 days | End: 2023-03-06
Payer: COMMERCIAL

## 2023-03-06 ENCOUNTER — APPOINTMENT (OUTPATIENT)
Dept: ULTRASOUND IMAGING | Facility: CLINIC | Age: 43
End: 2023-03-06
Payer: COMMERCIAL

## 2023-03-06 DIAGNOSIS — Z00.8 ENCOUNTER FOR OTHER GENERAL EXAMINATION: ICD-10-CM

## 2023-03-06 PROCEDURE — 77067 SCR MAMMO BI INCL CAD: CPT

## 2023-03-06 PROCEDURE — 76641 ULTRASOUND BREAST COMPLETE: CPT | Mod: 26,50

## 2023-03-06 PROCEDURE — 77067 SCR MAMMO BI INCL CAD: CPT | Mod: 26

## 2023-03-06 PROCEDURE — 76641 ULTRASOUND BREAST COMPLETE: CPT

## 2023-03-06 PROCEDURE — 77063 BREAST TOMOSYNTHESIS BI: CPT | Mod: 26

## 2023-03-06 PROCEDURE — 77063 BREAST TOMOSYNTHESIS BI: CPT

## 2023-07-12 NOTE — ED ADULT TRIAGE NOTE - AS TEMP SITE
Alvin un seguimiento con un neurólogo que hayamos enumerado con respecto a bella entumecimiento crónico en la mano. Regrese a la london de emergencias por cualquier síntoma o inquietud nueva o que empeore. Alvin un seguimiento con bella médico de cabecera dentro de las próximas 24 a 72 horas.  
oral

## 2024-01-23 ENCOUNTER — APPOINTMENT (OUTPATIENT)
Dept: SPINE | Facility: CLINIC | Age: 44
End: 2024-01-23
Payer: COMMERCIAL

## 2024-01-23 ENCOUNTER — NON-APPOINTMENT (OUTPATIENT)
Age: 44
End: 2024-01-23

## 2024-01-23 VITALS
WEIGHT: 146 LBS | SYSTOLIC BLOOD PRESSURE: 100 MMHG | DIASTOLIC BLOOD PRESSURE: 56 MMHG | OXYGEN SATURATION: 96 % | HEART RATE: 66 BPM | HEIGHT: 64 IN | BODY MASS INDEX: 24.92 KG/M2

## 2024-01-23 DIAGNOSIS — M54.12 RADICULOPATHY, CERVICAL REGION: ICD-10-CM

## 2024-01-23 PROCEDURE — 99204 OFFICE O/P NEW MOD 45 MIN: CPT

## 2024-01-23 RX ORDER — PSYLLIUM HUSK 0.4 G
CAPSULE ORAL
Refills: 0 | Status: DISCONTINUED | COMMUNITY
End: 2024-01-23

## 2024-01-23 NOTE — ASSESSMENT
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  She also experiences nerve type pain that radiates to the front of her upper chest.  Activity levels and proper body mechanics were discussed.  She is to avoid bending and lifting.  It was recommended that she start a course of physical therapy and she was provided with a referral.  It was also advised that she have cervical spine x rays to view her bony anatomy and alignment.  The patient is also to have MRIs of the cervical and thoracic spine to evaluate for any evidence of stenosis and nerve impingement which may be causing her symptoms.  She is to return to the office with the imaging fro Dr. Sommers to review.

## 2024-01-23 NOTE — HISTORY OF PRESENT ILLNESS
[Other: ___] : [unfilled] [FreeTextEntry1] : The patient reports having posterior neck pain which radiates down the left side into her arm to her pinky.  She also has radiation to both sides of the top part of her chest. 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  It also radiates bilaterally across her chest and has started to radiate up the back of her head.  This started in November and the patient rates her pain as being almost constant and is 7/10.  She denies any weakness or bowel or bladder problems.  The patient has tried acupuncture without significant relief.  She also takes 400 mg of Motrin as needed.  No imaging has been done.

## 2024-01-23 NOTE — PHYSICAL EXAM
[General Appearance - Alert] : alert [General Appearance - In No Acute Distress] : in no acute distress [General Appearance - Well Nourished] : well nourished [General Appearance - Well Developed] : well developed [General Appearance - Well-Appearing] : healthy appearing [] : normal voice and communication [Oriented To Time, Place, And Person] : oriented to person, place, and time [Impaired Insight] : insight and judgment were intact [Affect] : the affect was normal [Mood] : the mood was normal [Memory Recent] : recent memory was not impaired [Memory Remote] : remote memory was not impaired [Person] : oriented to person [Place] : oriented to place [Time] : oriented to time [Short Term Intact] : short term memory intact [Remote Intact] : remote memory intact [Span Intact] : the attention span was normal [Concentration Intact] : normal concentrating ability [Fluency] : fluency intact [Comprehension] : comprehension intact [Current Events] : adequate knowledge of current events [Past History] : adequate knowledge of personal past history [Vocabulary] : adequate range of vocabulary [Cranial Nerves Optic (II)] : visual acuity intact bilaterally,  pupils equal round and reactive to light [Cranial Nerves Oculomotor (III)] : extraocular motion intact [Cranial Nerves Facial (VII)] : face symmetrical [Cranial Nerves Trigeminal (V)] : facial sensation intact symmetrically [Cranial Nerves Vestibulocochlear (VIII)] : hearing was intact bilaterally [Cranial Nerves Accessory (XI - Cranial And Spinal)] : head turning and shoulder shrug symmetric [Cranial Nerves Glossopharyngeal (IX)] : tongue and palate midline [Cranial Nerves Hypoglossal (XII)] : there was no tongue deviation with protrusion [Motor Tone] : muscle tone was normal in all four extremities [Motor Strength] : muscle strength was normal in all four extremities [No Muscle Atrophy] : normal bulk in all four extremities [4] : T1 abductor digiti minimi 4/5 [5] : S1 flexor hallucis longus 5/5 [Sensation Tactile Decrease] : light touch was intact [Abnormal Walk] : normal gait [Past-pointing] : there was no past-pointing [Balance] : balance was intact [Tremor] : no tremor present [2+] : Patella left 2+ [___] : absent on the right [___] : absent on the left [Sanchez] : Sanchez's sign was not demonstrated [No Visual Abnormalities] : no visible abnormailities

## 2024-01-23 NOTE — REVIEW OF SYSTEMS
[Arm Weakness] : arm weakness [Hand Weakness] :  hand weakness [Chest Pain] : chest pain [As Noted in HPI] : as noted in HPI [Feelings Of Weakness] : feelings of weakness [Negative] : Heme/Lymph [FreeTextEntry5] : radiating nerve type pain from the back of her neck.

## 2024-01-23 NOTE — REASON FOR VISIT
[New Patient Visit] : a new patient visit [Referred By: _________] : Patient was referred by NICK [FreeTextEntry1] : The patient reports left sided posterior neck pain which radiates down her left arm to her pinky and around both sides to the top of her chest.

## 2024-01-31 ENCOUNTER — APPOINTMENT (OUTPATIENT)
Dept: MRI IMAGING | Facility: HOSPITAL | Age: 44
End: 2024-01-31
Payer: COMMERCIAL

## 2024-01-31 ENCOUNTER — OUTPATIENT (OUTPATIENT)
Dept: OUTPATIENT SERVICES | Facility: HOSPITAL | Age: 44
LOS: 1 days | End: 2024-01-31
Payer: COMMERCIAL

## 2024-01-31 DIAGNOSIS — M54.12 RADICULOPATHY, CERVICAL REGION: ICD-10-CM

## 2024-01-31 PROCEDURE — 72146 MRI CHEST SPINE W/O DYE: CPT | Mod: 26

## 2024-01-31 PROCEDURE — 72141 MRI NECK SPINE W/O DYE: CPT | Mod: 26

## 2024-01-31 PROCEDURE — 72146 MRI CHEST SPINE W/O DYE: CPT

## 2024-01-31 PROCEDURE — 72141 MRI NECK SPINE W/O DYE: CPT

## 2024-05-02 LAB
25(OH)D3 SERPL-MCNC: 61.7 NG/ML
ALBUMIN SERPL ELPH-MCNC: 4.9 G/DL
ALP BLD-CCNC: 56 U/L
ALT SERPL-CCNC: 20 U/L
ANION GAP SERPL CALC-SCNC: 15 MMOL/L
APPEARANCE: CLEAR
APTT BLD: 28.4 SEC
AST SERPL-CCNC: 23 U/L
BACTERIA: NEGATIVE
BASOPHILS # BLD AUTO: 0.04 K/UL
BASOPHILS NFR BLD AUTO: 0.5 %
BILIRUB SERPL-MCNC: 0.3 MG/DL
BILIRUBIN URINE: NEGATIVE
BLOOD URINE: NEGATIVE
BUN SERPL-MCNC: 20 MG/DL
CALCIUM SERPL-MCNC: 10.3 MG/DL
CHLORIDE SERPL-SCNC: 103 MMOL/L
CHOLEST SERPL-MCNC: 236 MG/DL
CO2 SERPL-SCNC: 23 MMOL/L
COLOR: NORMAL
CREAT SERPL-MCNC: 0.83 MG/DL
EGFR: 90 ML/MIN/1.73M2
EOSINOPHIL # BLD AUTO: 0.12 K/UL
EOSINOPHIL NFR BLD AUTO: 1.6 %
ESTIMATED AVERAGE GLUCOSE: 103 MG/DL
GLUCOSE QUALITATIVE U: NEGATIVE
GLUCOSE SERPL-MCNC: 92 MG/DL
HBA1C MFR BLD HPLC: 5.2 %
HCG SERPL-MCNC: <1 MIU/ML
HCT VFR BLD CALC: 42.9 %
HDLC SERPL-MCNC: 79 MG/DL
HGB BLD-MCNC: 13.7 G/DL
HYALINE CASTS: 1 /LPF
IMM GRANULOCYTES NFR BLD AUTO: 0.3 %
INR PPP: 0.86 RATIO
KETONES URINE: NEGATIVE
LDLC SERPL CALC-MCNC: 146 MG/DL
LEUKOCYTE ESTERASE URINE: NEGATIVE
LYMPHOCYTES # BLD AUTO: 2.45 K/UL
LYMPHOCYTES NFR BLD AUTO: 33.2 %
MAGNESIUM SERPL-MCNC: 2.1 MG/DL
MAN DIFF?: NORMAL
MCHC RBC-ENTMCNC: 30.1 PG
MCHC RBC-ENTMCNC: 31.9 GM/DL
MCV RBC AUTO: 94.3 FL
MICROSCOPIC-UA: NORMAL
MONOCYTES # BLD AUTO: 0.48 K/UL
MONOCYTES NFR BLD AUTO: 6.5 %
NEUTROPHILS # BLD AUTO: 4.26 K/UL
NEUTROPHILS NFR BLD AUTO: 57.9 %
NITRITE URINE: NEGATIVE
NONHDLC SERPL-MCNC: 156 MG/DL
PH URINE: 6
PLATELET # BLD AUTO: 265 K/UL
POTASSIUM SERPL-SCNC: 4.7 MMOL/L
PROT SERPL-MCNC: 7.9 G/DL
PROTEIN URINE: NEGATIVE
PT BLD: 10.1 SEC
RBC # BLD: 4.55 M/UL
RBC # FLD: 14.2 %
RED BLOOD CELLS URINE: 1 /HPF
SODIUM SERPL-SCNC: 142 MMOL/L
SPECIFIC GRAVITY URINE: 1.02
SQUAMOUS EPITHELIAL CELLS: 0 /HPF
T4 SERPL-MCNC: 5.9 UG/DL
TRIGL SERPL-MCNC: 51 MG/DL
TSH SERPL-ACNC: 2.38 UIU/ML
UROBILINOGEN URINE: NORMAL
WBC # FLD AUTO: 7.37 K/UL
WHITE BLOOD CELLS URINE: 1 /HPF

## 2024-10-01 ENCOUNTER — APPOINTMENT (OUTPATIENT)
Dept: CARDIOLOGY | Facility: CLINIC | Age: 44
End: 2024-10-01
Payer: COMMERCIAL

## 2024-10-01 ENCOUNTER — APPOINTMENT (OUTPATIENT)
Dept: INTERNAL MEDICINE | Facility: CLINIC | Age: 44
End: 2024-10-01
Payer: COMMERCIAL

## 2024-10-01 ENCOUNTER — NON-APPOINTMENT (OUTPATIENT)
Age: 44
End: 2024-10-01

## 2024-10-01 VITALS
HEIGHT: 64 IN | TEMPERATURE: 97.6 F | WEIGHT: 141.19 LBS | DIASTOLIC BLOOD PRESSURE: 70 MMHG | OXYGEN SATURATION: 99 % | SYSTOLIC BLOOD PRESSURE: 104 MMHG | BODY MASS INDEX: 24.1 KG/M2 | HEART RATE: 72 BPM

## 2024-10-01 DIAGNOSIS — R79.89 OTHER SPECIFIED ABNORMAL FINDINGS OF BLOOD CHEMISTRY: ICD-10-CM

## 2024-10-01 DIAGNOSIS — R07.9 CHEST PAIN, UNSPECIFIED: ICD-10-CM

## 2024-10-01 DIAGNOSIS — R50.9 FEVER, UNSPECIFIED: ICD-10-CM

## 2024-10-01 DIAGNOSIS — Z13.228 ENCOUNTER FOR SCREENING FOR OTHER METABOLIC DISORDERS: ICD-10-CM

## 2024-10-01 DIAGNOSIS — Z00.00 ENCOUNTER FOR GENERAL ADULT MEDICAL EXAMINATION W/OUT ABNORMAL FINDINGS: ICD-10-CM

## 2024-10-01 PROCEDURE — G2211 COMPLEX E/M VISIT ADD ON: CPT | Mod: NC

## 2024-10-01 PROCEDURE — 99214 OFFICE O/P EST MOD 30 MIN: CPT

## 2024-10-01 PROCEDURE — 93000 ELECTROCARDIOGRAM COMPLETE: CPT

## 2024-10-01 PROCEDURE — 93306 TTE W/DOPPLER COMPLETE: CPT

## 2024-10-02 ENCOUNTER — INPATIENT (INPATIENT)
Facility: HOSPITAL | Age: 44
LOS: 2 days | Discharge: HOME | DRG: 645 | End: 2024-10-05
Attending: STUDENT IN AN ORGANIZED HEALTH CARE EDUCATION/TRAINING PROGRAM | Admitting: STUDENT IN AN ORGANIZED HEALTH CARE EDUCATION/TRAINING PROGRAM
Payer: COMMERCIAL

## 2024-10-02 VITALS
HEART RATE: 77 BPM | TEMPERATURE: 98 F | DIASTOLIC BLOOD PRESSURE: 67 MMHG | HEIGHT: 65 IN | WEIGHT: 138.01 LBS | SYSTOLIC BLOOD PRESSURE: 114 MMHG | RESPIRATION RATE: 20 BRPM | OXYGEN SATURATION: 99 %

## 2024-10-02 PROBLEM — R79.89 ABNORMAL TSH: Status: ACTIVE | Noted: 2024-10-02

## 2024-10-02 LAB
25(OH)D3 SERPL-MCNC: 59.7 NG/ML
ALBUMIN SERPL ELPH-MCNC: 4.3 G/DL
ALP BLD-CCNC: 87 U/L
ALT SERPL-CCNC: 21 U/L
ANION GAP SERPL CALC-SCNC: 13 MMOL/L
APPEARANCE: ABNORMAL
APPEARANCE: CLEAR
AST SERPL-CCNC: 21 U/L
BACTERIA: ABNORMAL /HPF
BACTERIA: ABNORMAL /HPF
BILIRUB DIRECT SERPL-MCNC: 0.1 MG/DL
BILIRUB INDIRECT SERPL-MCNC: 0.2 MG/DL
BILIRUB SERPL-MCNC: 0.4 MG/DL
BILIRUBIN URINE: NEGATIVE
BILIRUBIN URINE: NEGATIVE
BLOOD URINE: ABNORMAL
BLOOD URINE: NEGATIVE
BUN SERPL-MCNC: 12 MG/DL
CALCIUM OXALATE CRYSTALS: PRESENT
CALCIUM SERPL-MCNC: 10 MG/DL
CAST: 3 /LPF
CAST: 3 /LPF
CCP AB SER IA-ACNC: <8 U/ML
CHLORIDE SERPL-SCNC: 102 MMOL/L
CHOLEST SERPL-MCNC: 138 MG/DL
CO2 SERPL-SCNC: 26 MMOL/L
COLOR: NORMAL
COLOR: YELLOW
CREAT SERPL-MCNC: 0.63 MG/DL
CRP SERPL-MCNC: 21 MG/L
EGFR: 112 ML/MIN/1.73M2
EPITHELIAL CELLS: >36 /HPF
EPITHELIAL CELLS: >36 /HPF
ERYTHROCYTE [SEDIMENTATION RATE] IN BLOOD BY WESTERGREN METHOD: 47 MM/HR
ESTIMATED AVERAGE GLUCOSE: 120 MG/DL
FERRITIN SERPL-MCNC: 283 NG/ML
GLUCOSE QUALITATIVE U: NEGATIVE MG/DL
GLUCOSE QUALITATIVE U: NEGATIVE MG/DL
GLUCOSE SERPL-MCNC: 104 MG/DL
HBA1C MFR BLD HPLC: 5.8 %
HCT VFR BLD CALC: 36.9 %
HDLC SERPL-MCNC: 43 MG/DL
HGB BLD-MCNC: 12 G/DL
IRON SATN MFR SERPL: 26 %
IRON SERPL-MCNC: 61 UG/DL
KETONES URINE: ABNORMAL MG/DL
KETONES URINE: ABNORMAL MG/DL
LDLC SERPL CALC-MCNC: 80 MG/DL
LEUKOCYTE ESTERASE URINE: ABNORMAL
LEUKOCYTE ESTERASE URINE: ABNORMAL
MCHC RBC-ENTMCNC: 28.6 PG
MCHC RBC-ENTMCNC: 32.5 GM/DL
MCV RBC AUTO: 87.9 FL
MICROSCOPIC-UA: NORMAL
MICROSCOPIC-UA: NORMAL
NITRITE URINE: NEGATIVE
NITRITE URINE: NEGATIVE
NONHDLC SERPL-MCNC: 95 MG/DL
PH URINE: 6
PH URINE: 6.5
PLATELET # BLD AUTO: 351 K/UL
POTASSIUM SERPL-SCNC: 5 MMOL/L
PROT SERPL-MCNC: 7.5 G/DL
PROTEIN URINE: 30 MG/DL
PROTEIN URINE: 30 MG/DL
RBC # BLD: 4.2 M/UL
RBC # FLD: 11.6 %
RED BLOOD CELLS URINE: 4 /HPF
RED BLOOD CELLS URINE: 7 /HPF
REVIEW: NORMAL
REVIEW: NORMAL
RF+CCP IGG SER-IMP: NEGATIVE
RHEUMATOID FACT SER QL: 12 IU/ML
SODIUM SERPL-SCNC: 141 MMOL/L
SPECIFIC GRAVITY URINE: 1.02
SPECIFIC GRAVITY URINE: >1.03
T3FREE SERPL-MCNC: 14.3 PG/ML
T4 FREE SERPL-MCNC: 6.4 NG/DL
THYROGLOB AB SERPL-ACNC: 20.7 IU/ML
THYROPEROXIDASE AB SERPL IA-ACNC: 21.1 IU/ML
TIBC SERPL-MCNC: 238 UG/DL
TRIGL SERPL-MCNC: 72 MG/DL
TSH SERPL-ACNC: <0.01 UIU/ML
UIBC SERPL-MCNC: 177 UG/DL
UROBILINOGEN URINE: 1 MG/DL
UROBILINOGEN URINE: 1 MG/DL
VIT B12 SERPL-MCNC: 757 PG/ML
WBC # FLD AUTO: 6.85 K/UL
WHITE BLOOD CELLS URINE: 2 /HPF
WHITE BLOOD CELLS URINE: 4 /HPF
YEAST-LIKE CELLS: PRESENT

## 2024-10-02 PROCEDURE — 99285 EMERGENCY DEPT VISIT HI MDM: CPT

## 2024-10-02 RX ORDER — SODIUM CHLORIDE 0.9 % (FLUSH) 0.9 %
1000 SYRINGE (ML) INJECTION ONCE
Refills: 0 | Status: COMPLETED | OUTPATIENT
Start: 2024-10-02 | End: 2024-10-02

## 2024-10-02 RX ORDER — ACETAMINOPHEN 325 MG
1000 TABLET ORAL ONCE
Refills: 0 | Status: COMPLETED | OUTPATIENT
Start: 2024-10-02 | End: 2024-10-02

## 2024-10-02 RX ORDER — PROPRANOLOL HYDROCHLORIDE 10 MG/1
10 TABLET ORAL
Qty: 60 | Refills: 0 | Status: ACTIVE | COMMUNITY
Start: 2024-10-02 | End: 1900-01-01

## 2024-10-02 NOTE — HISTORY OF PRESENT ILLNESS
[FreeTextEntry8] : 44 year old female presents with complaints of fatigue, palpitations and an overall sensation of not feeling well.  Currently she denies any acute chest pain, shortness of breath or cough.

## 2024-10-02 NOTE — PLAN
[FreeTextEntry1] : Chest pain/palpitations - will check holter and Echo Fever/Fatigue - vitals stable, will check labs  Addendum 10/2/24 - labs reviewed with patient, TSH suppressed, will add on Free T4/T3 and have endocrinology evaluation

## 2024-10-02 NOTE — REVIEW OF SYSTEMS
[Fever] : fever [Chills] : chills [Fatigue] : fatigue [Discharge] : no discharge [Vision Problems] : no vision problems [Earache] : no earache [Nasal Discharge] : no nasal discharge [Chest Pain] : chest pain [Palpitations] : palpitations [Shortness Of Breath] : shortness of breath [Wheezing] : no wheezing [Cough] : no cough [Abdominal Pain] : no abdominal pain [Vomiting] : no vomiting [Dysuria] : no dysuria [Hematuria] : no hematuria [Joint Pain] : no joint pain [Muscle Pain] : no muscle pain [Itching] : no itching [Headache] : no headache [Memory Loss] : no memory loss [Suicidal] : not suicidal [Easy Bleeding] : no easy bleeding

## 2024-10-03 DIAGNOSIS — Z86.39 PERSONAL HISTORY OF OTHER ENDOCRINE, NUTRITIONAL AND METABOLIC DISEASE: ICD-10-CM

## 2024-10-03 DIAGNOSIS — D64.9 ANEMIA, UNSPECIFIED: ICD-10-CM

## 2024-10-03 DIAGNOSIS — Z98.891 HISTORY OF UTERINE SCAR FROM PREVIOUS SURGERY: Chronic | ICD-10-CM

## 2024-10-03 DIAGNOSIS — Z90.89 ACQUIRED ABSENCE OF OTHER ORGANS: Chronic | ICD-10-CM

## 2024-10-03 DIAGNOSIS — E05.90 THYROTOXICOSIS, UNSPECIFIED WITHOUT THYROTOXIC CRISIS OR STORM: ICD-10-CM

## 2024-10-03 DIAGNOSIS — R09.89 OTHER SPECIFIED SYMPTOMS AND SIGNS INVOLVING THE CIRCULATORY AND RESPIRATORY SYSTEMS: ICD-10-CM

## 2024-10-03 DIAGNOSIS — E06.1 SUBACUTE THYROIDITIS: ICD-10-CM

## 2024-10-03 LAB
ADD ON TEST-SPECIMEN IN LAB: SIGNIFICANT CHANGE UP
APPEARANCE UR: CLEAR — SIGNIFICANT CHANGE UP
APTT BLD: 28.9 SEC — SIGNIFICANT CHANGE UP (ref 24.5–35.6)
BACTERIA # UR AUTO: NEGATIVE /HPF — SIGNIFICANT CHANGE UP
BACTERIA UR CULT: NORMAL
BASOPHILS # BLD AUTO: 0.01 K/UL — SIGNIFICANT CHANGE UP (ref 0–0.2)
BASOPHILS # BLD AUTO: 0.01 K/UL — SIGNIFICANT CHANGE UP (ref 0–0.2)
BASOPHILS NFR BLD AUTO: 0.1 % — SIGNIFICANT CHANGE UP (ref 0–2)
BASOPHILS NFR BLD AUTO: 0.2 % — SIGNIFICANT CHANGE UP (ref 0–2)
BILIRUB UR-MCNC: NEGATIVE — SIGNIFICANT CHANGE UP
CAST: 0 /LPF — SIGNIFICANT CHANGE UP (ref 0–4)
CK MB CFR SERPL CALC: 1.4 NG/ML — SIGNIFICANT CHANGE UP (ref 0–3.8)
CK SERPL-CCNC: 47 U/L — SIGNIFICANT CHANGE UP (ref 25–170)
COLOR SPEC: YELLOW — SIGNIFICANT CHANGE UP
DIFF PNL FLD: NEGATIVE — SIGNIFICANT CHANGE UP
EOSINOPHIL # BLD AUTO: 0.13 K/UL — SIGNIFICANT CHANGE UP (ref 0–0.5)
EOSINOPHIL # BLD AUTO: 0.14 K/UL — SIGNIFICANT CHANGE UP (ref 0–0.5)
EOSINOPHIL NFR BLD AUTO: 2 % — SIGNIFICANT CHANGE UP (ref 0–6)
EOSINOPHIL NFR BLD AUTO: 2.5 % — SIGNIFICANT CHANGE UP (ref 0–6)
GAS PNL BLDV: SIGNIFICANT CHANGE UP
GLUCOSE UR QL: NEGATIVE MG/DL — SIGNIFICANT CHANGE UP
HCG SERPL-ACNC: <2 MIU/ML — SIGNIFICANT CHANGE UP
HCT VFR BLD CALC: 27.7 % — LOW (ref 34.5–45)
HCT VFR BLD CALC: 30.8 % — LOW (ref 34.5–45)
HGB BLD-MCNC: 8.9 G/DL — LOW (ref 11.5–15.5)
HGB BLD-MCNC: 9.9 G/DL — LOW (ref 11.5–15.5)
IMM GRANULOCYTES NFR BLD AUTO: 0.2 % — SIGNIFICANT CHANGE UP (ref 0–0.9)
IMM GRANULOCYTES NFR BLD AUTO: 0.4 % — SIGNIFICANT CHANGE UP (ref 0–0.9)
INR BLD: 0.94 RATIO — SIGNIFICANT CHANGE UP (ref 0.85–1.16)
KETONES UR-MCNC: ABNORMAL MG/DL
LEUKOCYTE ESTERASE UR-ACNC: NEGATIVE — SIGNIFICANT CHANGE UP
LIDOCAIN IGE QN: 235 U/L — HIGH (ref 7–60)
LYMPHOCYTES # BLD AUTO: 2.02 K/UL — SIGNIFICANT CHANGE UP (ref 1–3.3)
LYMPHOCYTES # BLD AUTO: 2.96 K/UL — SIGNIFICANT CHANGE UP (ref 1–3.3)
LYMPHOCYTES # BLD AUTO: 38.8 % — SIGNIFICANT CHANGE UP (ref 13–44)
LYMPHOCYTES # BLD AUTO: 41.4 % — SIGNIFICANT CHANGE UP (ref 13–44)
M TB IFN-G BLD-IMP: NEGATIVE
MAGNESIUM SERPL-MCNC: 2.1 MG/DL — SIGNIFICANT CHANGE UP (ref 1.6–2.6)
MCHC RBC-ENTMCNC: 28.6 PG — SIGNIFICANT CHANGE UP (ref 27–34)
MCHC RBC-ENTMCNC: 28.6 PG — SIGNIFICANT CHANGE UP (ref 27–34)
MCHC RBC-ENTMCNC: 32.1 GM/DL — SIGNIFICANT CHANGE UP (ref 32–36)
MCHC RBC-ENTMCNC: 32.1 GM/DL — SIGNIFICANT CHANGE UP (ref 32–36)
MCV RBC AUTO: 89 FL — SIGNIFICANT CHANGE UP (ref 80–100)
MCV RBC AUTO: 89.1 FL — SIGNIFICANT CHANGE UP (ref 80–100)
MONOCYTES # BLD AUTO: 0.52 K/UL — SIGNIFICANT CHANGE UP (ref 0–0.9)
MONOCYTES # BLD AUTO: 0.58 K/UL — SIGNIFICANT CHANGE UP (ref 0–0.9)
MONOCYTES NFR BLD AUTO: 10 % — SIGNIFICANT CHANGE UP (ref 2–14)
MONOCYTES NFR BLD AUTO: 8.1 % — SIGNIFICANT CHANGE UP (ref 2–14)
NEUTROPHILS # BLD AUTO: 2.51 K/UL — SIGNIFICANT CHANGE UP (ref 1.8–7.4)
NEUTROPHILS # BLD AUTO: 3.43 K/UL — SIGNIFICANT CHANGE UP (ref 1.8–7.4)
NEUTROPHILS NFR BLD AUTO: 48 % — SIGNIFICANT CHANGE UP (ref 43–77)
NEUTROPHILS NFR BLD AUTO: 48.3 % — SIGNIFICANT CHANGE UP (ref 43–77)
NITRITE UR-MCNC: NEGATIVE — SIGNIFICANT CHANGE UP
NRBC # BLD: 0 /100 WBCS — SIGNIFICANT CHANGE UP (ref 0–0)
NRBC # BLD: 0 /100 WBCS — SIGNIFICANT CHANGE UP (ref 0–0)
PH UR: 5.5 — SIGNIFICANT CHANGE UP (ref 5–8)
PLATELET # BLD AUTO: 229 K/UL — SIGNIFICANT CHANGE UP (ref 150–400)
PLATELET # BLD AUTO: 277 K/UL — SIGNIFICANT CHANGE UP (ref 150–400)
PROT UR-MCNC: NEGATIVE MG/DL — SIGNIFICANT CHANGE UP
PROTHROM AB SERPL-ACNC: 10.7 SEC — SIGNIFICANT CHANGE UP (ref 9.9–13.4)
QUANTIFERON TB PLUS MITOGEN MINUS NIL: 1.84 IU/ML
QUANTIFERON TB PLUS NIL: 0.04 IU/ML
QUANTIFERON TB PLUS TB1 MINUS NIL: 0.01 IU/ML
QUANTIFERON TB PLUS TB2 MINUS NIL: 0.01 IU/ML
RBC # BLD: 3.11 M/UL — LOW (ref 3.8–5.2)
RBC # BLD: 3.46 M/UL — LOW (ref 3.8–5.2)
RBC # FLD: 11.5 % — SIGNIFICANT CHANGE UP (ref 10.3–14.5)
RBC # FLD: 11.6 % — SIGNIFICANT CHANGE UP (ref 10.3–14.5)
RBC CASTS # UR COMP ASSIST: 2 /HPF — SIGNIFICANT CHANGE UP (ref 0–4)
SP GR SPEC: >1.03 — HIGH (ref 1–1.03)
SQUAMOUS # UR AUTO: 2 /HPF — SIGNIFICANT CHANGE UP (ref 0–5)
T3 SERPL-MCNC: 292 NG/DL — HIGH (ref 80–200)
T4 AB SER-ACNC: 19.3 UG/DL — HIGH (ref 4.6–12)
T4 FREE SERPL-MCNC: 4.9 NG/DL — HIGH (ref 0.9–1.8)
TSH SERPL-MCNC: <0.01 UIU/ML — LOW (ref 0.27–4.2)
UROBILINOGEN FLD QL: 1 MG/DL — SIGNIFICANT CHANGE UP (ref 0.2–1)
WBC # BLD: 5.2 K/UL — SIGNIFICANT CHANGE UP (ref 3.8–10.5)
WBC # BLD: 7.15 K/UL — SIGNIFICANT CHANGE UP (ref 3.8–10.5)
WBC # FLD AUTO: 5.2 K/UL — SIGNIFICANT CHANGE UP (ref 3.8–10.5)
WBC # FLD AUTO: 7.15 K/UL — SIGNIFICANT CHANGE UP (ref 3.8–10.5)
WBC UR QL: 1 /HPF — SIGNIFICANT CHANGE UP (ref 0–5)

## 2024-10-03 PROCEDURE — 99221 1ST HOSP IP/OBS SF/LOW 40: CPT

## 2024-10-03 PROCEDURE — 99223 1ST HOSP IP/OBS HIGH 75: CPT

## 2024-10-03 PROCEDURE — 74177 CT ABD & PELVIS W/CONTRAST: CPT | Mod: 26,MC

## 2024-10-03 PROCEDURE — 76705 ECHO EXAM OF ABDOMEN: CPT | Mod: 26

## 2024-10-03 PROCEDURE — 99222 1ST HOSP IP/OBS MODERATE 55: CPT

## 2024-10-03 PROCEDURE — 71046 X-RAY EXAM CHEST 2 VIEWS: CPT | Mod: 26

## 2024-10-03 PROCEDURE — 71275 CT ANGIOGRAPHY CHEST: CPT | Mod: 26,MC

## 2024-10-03 RX ORDER — KETOROLAC TROMETHAMINE 10 MG/1
30 TABLET, FILM COATED ORAL EVERY 6 HOURS
Refills: 0 | Status: DISCONTINUED | OUTPATIENT
Start: 2024-10-03 | End: 2024-10-05

## 2024-10-03 RX ORDER — ACETAMINOPHEN 325 MG
1000 TABLET ORAL ONCE
Refills: 0 | Status: COMPLETED | OUTPATIENT
Start: 2024-10-03 | End: 2024-10-03

## 2024-10-03 RX ORDER — 5-HYDROXYTRYPTOPHAN (5-HTP) 100 MG
3 TABLET,DISINTEGRATING ORAL AT BEDTIME
Refills: 0 | Status: DISCONTINUED | OUTPATIENT
Start: 2024-10-03 | End: 2024-10-04

## 2024-10-03 RX ORDER — KETOROLAC TROMETHAMINE 10 MG/1
15 TABLET, FILM COATED ORAL ONCE
Refills: 0 | Status: DISCONTINUED | OUTPATIENT
Start: 2024-10-03 | End: 2024-10-03

## 2024-10-03 RX ORDER — ACETAMINOPHEN 325 MG
650 TABLET ORAL EVERY 6 HOURS
Refills: 0 | Status: DISCONTINUED | OUTPATIENT
Start: 2024-10-03 | End: 2024-10-04

## 2024-10-03 RX ORDER — FAMOTIDINE 40 MG
20 TABLET ORAL
Refills: 0 | Status: DISCONTINUED | OUTPATIENT
Start: 2024-10-03 | End: 2024-10-04

## 2024-10-03 RX ORDER — ONDANSETRON HCL/PF 4 MG/2 ML
4 VIAL (ML) INJECTION EVERY 8 HOURS
Refills: 0 | Status: DISCONTINUED | OUTPATIENT
Start: 2024-10-03 | End: 2024-10-05

## 2024-10-03 RX ORDER — HYDROMORPHONE HYDROCHLORIDE 1 MG/ML
0.25 INJECTION, SOLUTION INTRAMUSCULAR; INTRAVENOUS; SUBCUTANEOUS EVERY 4 HOURS
Refills: 0 | Status: DISCONTINUED | OUTPATIENT
Start: 2024-10-03 | End: 2024-10-04

## 2024-10-03 RX ORDER — KETOROLAC TROMETHAMINE 10 MG/1
30 TABLET, FILM COATED ORAL ONCE
Refills: 0 | Status: DISCONTINUED | OUTPATIENT
Start: 2024-10-03 | End: 2024-10-03

## 2024-10-03 RX ORDER — ATENOLOL 25 MG/1
25 TABLET ORAL DAILY
Refills: 0 | Status: DISCONTINUED | OUTPATIENT
Start: 2024-10-03 | End: 2024-10-05

## 2024-10-03 RX ADMIN — Medication 600 MILLIGRAM(S): at 11:48

## 2024-10-03 RX ADMIN — Medication 3 MILLIGRAM(S): at 21:19

## 2024-10-03 RX ADMIN — Medication 1000 MILLILITER(S): at 00:38

## 2024-10-03 RX ADMIN — KETOROLAC TROMETHAMINE 30 MILLIGRAM(S): 10 TABLET, FILM COATED ORAL at 21:18

## 2024-10-03 RX ADMIN — KETOROLAC TROMETHAMINE 30 MILLIGRAM(S): 10 TABLET, FILM COATED ORAL at 18:02

## 2024-10-03 RX ADMIN — Medication 650 MILLIGRAM(S): at 09:53

## 2024-10-03 RX ADMIN — Medication 400 MILLIGRAM(S): at 00:38

## 2024-10-03 RX ADMIN — Medication 4 MILLIGRAM(S): at 15:18

## 2024-10-03 RX ADMIN — Medication 400 MILLIGRAM(S): at 14:56

## 2024-10-03 RX ADMIN — KETOROLAC TROMETHAMINE 30 MILLIGRAM(S): 10 TABLET, FILM COATED ORAL at 16:59

## 2024-10-03 RX ADMIN — ATENOLOL 25 MILLIGRAM(S): 25 TABLET ORAL at 18:02

## 2024-10-03 RX ADMIN — Medication 20 MILLIGRAM(S): at 17:01

## 2024-10-03 RX ADMIN — Medication 400 MILLIGRAM(S): at 05:41

## 2024-10-03 RX ADMIN — Medication 600 MILLIGRAM(S): at 13:00

## 2024-10-03 RX ADMIN — KETOROLAC TROMETHAMINE 15 MILLIGRAM(S): 10 TABLET, FILM COATED ORAL at 01:31

## 2024-10-03 RX ADMIN — Medication 650 MILLIGRAM(S): at 09:04

## 2024-10-03 NOTE — H&P ADULT - NSHPPHYSICALEXAM_GEN_ALL_CORE
T(C): 36.7 (10-03-24 @ 08:41), Max: 36.7 (10-03-24 @ 03:15)  HR: 79 (10-03-24 @ 08:41) (75 - 91)  BP: 110/62 (10-03-24 @ 08:41) (104/68 - 120/80)  RR: 18 (10-03-24 @ 08:41) (15 - 20)  SpO2: 96% (10-03-24 @ 08:41) (96% - 99%)    GEN: female in NAD, no diaphoresis  EYES: No scleral injection, EOMI  ENTM: neck supple & symmetric without tracheal deviation, moist membranes, no gross hearing impairment  CV: +S1/S2, no m/r/g, no abdominal bruit, no LE edema  RESP: breathing comfortably, no respiratory accessory muscle use, CTAB, no w/r/r  GI: normoactive BS, soft, NTND, no rebounding/guarding, no palpable masses  LYMPHATICS: no LAD or tenderness to palpation  NEURO: AOx3, no focal deficits, CNII-XII grossly intact  PSYCH: No SI/HI/AVH, appropriate affect, appropriate insight/judgment   SKIN: no petechiae, ecchymosis or maculopapular rash noted

## 2024-10-03 NOTE — ED PROVIDER NOTE - PHYSICAL EXAMINATION
Bob Iniguez DO (PGY1)   Physical Exam:    Gen: NAD, AOx3, fatigued appearing, mildly pale  Head: NCAT  HEENT: EOMI, PEERLA, pink and moist mucous membranes  Lung: CTAB, no respiratory distress, no wheezes/rhonchi/rales B/L  CV: RRR, no murmurs, rubs or gallops  Abd: soft, ND, no guarding, no rigidity, no rebound tenderness, no CVA tenderness.  Patient notes subjective general abdominal tenderness throughout but not in one particular area  MSK: no visible deformities, ROM normal in UE/LE, no back pain  Neuro: No focal sensory or motor deficits. Sensation intact to light touch all extremities.  Skin: Warm, well perfused, no rash, no leg swelling  Psych: normal affect, calm

## 2024-10-03 NOTE — ED PROVIDER NOTE - OBJECTIVE STATEMENT
44-year-old female with no past medical history, surgical history of  and appendectomy, presents today for generalized bodyaches and feeling weak.  She notes that on  she had flulike symptoms for roughly 5 days and afterwards started feeling very weak.  Roughly over the past 2 weeks she has been getting progressively more weak, notes intermittent palpitations, and sharp chest pains worsening over the past 5 days, and notes that her pains throughout her entire body have been getting worse.  Endorses 8lb weight loss, nausea as well.  She recently saw her PCP who took labs on her and found a TSH of less than 0.01, a T3 of 14.3, and a t4 of 6.4, as seen on the [facility] HIE which was just resulted yesterday.  She notes that her PCP prescribed her propranolol but she has been unable to take it as she has not picked it up from the pharmacy.  Notes that her weakness generally has progressed as she can barely walk but can.  She denies fever, chills, night sweats, shortness of breath, diarrhea, constipation, pedal edema, brittle hair or skin.

## 2024-10-03 NOTE — CONSULT NOTE ADULT - ASSESSMENT
44F with no significant PMH here for generalized unwell, weakness and myalgia. She was treated with abx and placed on Holter at PCP office which showed frequent PVCs. In the ER, CTA was negative for PE. Found to be in thyrotoxicosis and house endocrinology following. \    1. PVCs   2. Thyrotoxicosis   3. Chest pain     -her cardiac sxs appear to be primarily thyrotoxicosis driven, would uptitrate atenolol to 50 mg qd   -obtain TTE to evaluate for LV/RV function and any valvular pathology   -conservative management and symptoms control   -monitor on telemetry and K>4 and Mg >2     Thank you for allowing us to participate in the care of your patient. If you have any questions or concerns please do not hesitate to contact me 24/7.     Samantha Kapoor MD   Interventional Cardiology and General Cardiology Attending, Ta/MARIANO  [employer] on [employer] Team     44F with no significant PMH here for generalized unwell, weakness and myalgia. She was treated with abx and placed on Holter at PCP office which showed frequent PVCs. In the ER, CTA was negative for PE. Found to be in thyrotoxicosis and house endocrinology following. \    1. PVCs   2. Thyrotoxicosis   3. Chest pain     -her cardiac sxs appear to be primarily thyrotoxicosis driven, would uptitrate atenolol to 50 mg qd   -obtain TTE to evaluate for LV/RV function and any valvular pathology   -please ensure adequate pain control   -conservative management and symptoms control   -monitor on telemetry and K>4 and Mg >2     Thank you for allowing us to participate in the care of your patient. If you have any questions or concerns please do not hesitate to contact me 24/7.     Samantha Kapoor MD   Interventional Cardiology and General Cardiology Attending, Justo-NS/MARIANO  [employer] on [employer] Team

## 2024-10-03 NOTE — CONSULT NOTE ADULT - TIME BILLING
79 minutes were spent on this encounter for extensive review of medical record details including labs and/or imaging studies and/or adjacent care team and consultant records, as well as review and reconciliation of current medications. Time was spent on obtaining a history, performing physical examination of patient, and answering patient and/or family questions regarding plan of care. Time was also spent discussing plan of care with patient’s other care team members including primary and consulting teams. Time also was spent on documentation of this encounter into the EHR.

## 2024-10-03 NOTE — CONSULT NOTE ADULT - SUBJECTIVE AND OBJECTIVE BOX
Cardiology Attending Consultation Note/Covering for Dr. Talbert     Patient seen and evaluated at bedside    Chief Complaint: anxiety/pvcs     HPI:        PMHx:   No pertinent past medical history     delivery delivered    No pertinent past medical history        PSHx:    delivery delivered    History of     History of tonsillectomy        Allergies:  No Known Allergies      Home Meds:    Current Medications:   acetaminophen     Tablet .. 650 milliGRAM(s) Oral every 6 hours PRN  ibuprofen  Tablet. 600 milliGRAM(s) Oral three times a day PRN  melatonin 3 milliGRAM(s) Oral at bedtime PRN  ondansetron Injectable 4 milliGRAM(s) IV Push every 8 hours PRN      FAMILY HISTORY:  Family history of MI (myocardial infarction) (Father)        Social History: Personally reviewed   No tobacco, EtOH or IVDU     REVIEW OF SYSTEMS:  Constitutional:     [x ] negative [ ] fevers [ ] chills [ ] weight loss [ ] weight gain  HEENT:                  [x ] negative [ ] dry eyes [ ] eye irritation [ ] postnasal drip [ ] nasal congestion  CV:                         [ x] negative  [ ] chest pain [ ] orthopnea [ ] palpitations [ ] murmur  Resp:                     [x ] negative [ ] cough [ ] shortness of breath [ ] dyspnea [ ] wheezing [ ] sputum [ ]hemoptysis  GI:                          [ x] negative [ ] nausea [ ] vomiting [ ] diarrhea [ ] constipation [ ] abd pain [ ] dysphagia   :                        [ x] negative [ ] dysuria [ ] nocturia [ ] hematuria [ ] increased urinary frequency  Musculoskeletal: [x ] negative [ ] back pain [ ] myalgias [ ] arthralgias [ ] fracture  Skin:                       [ x] negative [ ] rash [ ] itch  Neurological:        [ x] negative [ ] headache [ ] dizziness [ ] syncope [ ] weakness [ ] numbness  Psychiatric:           [ x] negative [ ] anxiety [ ] depression  Endocrine:            [ x] negative [ ] diabetes [ ] thyroid problem  Heme/Lymph:      [ x] negative [ ] anemia [ ] bleeding problem  Allergic/Immune: [ x] negative [ ] itchy eyes [ ] nasal discharge [ ] hives [ ] angioedema    [ x] All other systems negative  [ ] Unable to assess ROS due to      Physical Exam:  T(F): 98.1 (10-), Max: 98.1 (10-03)  HR: 79 (10-03) (75 - 91)  BP: 110/62 (10-03) (104/68 - 120/80)  RR: 18 (10-03)  SpO2: 96% (10-03)    Gen: Well appearing   HENNT: No JVP   CV: regular rate, regular rhtyhm, no murmur   Pulm: clear to auscultation bilaterally   Abdomen: Non-tender, non-distended   Ext: no edema b/l   Neuro: grossly non-focal     Cardiovascular Diagnostic Testing:      Labs: Personally reviewed                        8.9    5.20  )-----------( 229      ( 03 Oct 2024 06:31 )             27.7     10-03    140  |  106  |  20  ----------------------------<  99  4.0   |  23  |  0.49[L]    Ca    9.0      03 Oct 2024 00:23  Mg     2.1     10-03    TPro  6.8  /  Alb  3.5  /  TBili  0.1[L]  /  DBili  x   /  AST  17  /  ALT  19  /  AlkPhos  74  10-03    PT/INR - ( 03 Oct 2024 00:21 )   PT: 10.7 sec;   INR: 0.94 ratio         PTT - ( 03 Oct 2024 00:21 )  PTT:28.9 sec         Cardiology Attending Consultation Note/Covering for Dr. Talbert     Patient seen and evaluated at bedside    Chief Complaint: anxiety/pvcs     HPI:  44F with no significant PMH here for generalized unwell, weakness and myalgia. She was treated with abx and placed on Holter at PCP office which showed frequent PVCs. In the ER, CTA was negative for PE. Found to be in thyrotoxicosis and house endocrinology following.       PMHx:   No pertinent past medical history     delivery delivered    No pertinent past medical history        PSHx:    delivery delivered    History of     History of tonsillectomy        Allergies:  No Known Allergies      Home Meds:    Current Medications:   acetaminophen     Tablet .. 650 milliGRAM(s) Oral every 6 hours PRN  ibuprofen  Tablet. 600 milliGRAM(s) Oral three times a day PRN  melatonin 3 milliGRAM(s) Oral at bedtime PRN  ondansetron Injectable 4 milliGRAM(s) IV Push every 8 hours PRN      FAMILY HISTORY:  Family history of MI (myocardial infarction) (Father)        Social History: Personally reviewed   No tobacco, EtOH or IVDU     REVIEW OF SYSTEMS:  Constitutional:     [x ] negative [ ] fevers [ ] chills [ ] weight loss [ ] weight gain  HEENT:                  [x ] negative [ ] dry eyes [ ] eye irritation [ ] postnasal drip [ ] nasal congestion  CV:                         [ x] negative  [ ] chest pain [ ] orthopnea [ ] palpitations [ ] murmur  Resp:                     [x ] negative [ ] cough [ ] shortness of breath [ ] dyspnea [ ] wheezing [ ] sputum [ ]hemoptysis  GI:                          [ x] negative [ ] nausea [ ] vomiting [ ] diarrhea [ ] constipation [ ] abd pain [ ] dysphagia   :                        [ x] negative [ ] dysuria [ ] nocturia [ ] hematuria [ ] increased urinary frequency  Musculoskeletal: [x ] negative [ ] back pain [ ] myalgias [ ] arthralgias [ ] fracture  Skin:                       [ x] negative [ ] rash [ ] itch  Neurological:        [ x] negative [ ] headache [ ] dizziness [ ] syncope [ ] weakness [ ] numbness  Psychiatric:           [ x] negative [ ] anxiety [ ] depression  Endocrine:            [ x] negative [ ] diabetes [ ] thyroid problem  Heme/Lymph:      [ x] negative [ ] anemia [ ] bleeding problem  Allergic/Immune: [ x] negative [ ] itchy eyes [ ] nasal discharge [ ] hives [ ] angioedema    [ x] All other systems negative  [ ] Unable to assess ROS due to      Physical Exam:  T(F): 98.1 (10-03), Max: 98.1 (10-03)  HR: 79 (10-03) (75 - 91)  BP: 110/62 (10-03) (104/68 - 120/80)  RR: 18 (10-03)  SpO2: 96% (10-03)    Gen: Well appearing   HENNT: No JVP   CV: regular rate, regular rhtyhm, no murmur   Pulm: clear to auscultation bilaterally   Abdomen: Non-tender, non-distended   Ext: no edema b/l   Neuro: grossly non-focal     Cardiovascular Diagnostic Testing:      Labs: Personally reviewed                        8.9    5.20  )-----------( 229      ( 03 Oct 2024 06:31 )             27.7     10-03    140  |  106  |  20  ----------------------------<  99  4.0   |  23  |  0.49[L]    Ca    9.0      03 Oct 2024 00:23  Mg     2.1     10-03    TPro  6.8  /  Alb  3.5  /  TBili  0.1[L]  /  DBili  x   /  AST  17  /  ALT  19  /  AlkPhos  74  10    PT/INR - ( 03 Oct 2024 00:21 )   PT: 10.7 sec;   INR: 0.94 ratio         PTT - ( 03 Oct 2024 00:21 )  PTT:28.9 sec

## 2024-10-03 NOTE — ED ADULT NURSE NOTE - OBJECTIVE STATEMENT
pt is a 45yo female no pertinent PMH presenting to the ED complaining of abnormal lab result. pt reports flu like symptoms in mid september, has since recovered but reports experiencing generalized weakness, malaise, and body aches since. pt states weakness has progressively worsened over the past 2 weeks, is associated with persistent nausea and unintentional weight loss of around 8lbs. pt also presents to ED complaining of intermittent palpitations and "sharp" chest pains, pt evaluated by outpatient MD, prescribed propanolol for palpitations but has not picked up prescription yet, pt outpatient labs also showed TSH <.01, pt urged to come to ED by outpatient MD. pt A&Ox3 gross neuro intact, no difficulty speaking in complete sentences, pulses x 4, vergara x4, abdomen soft nontender nondistended, skin intact. pt denies sob, ha, abdominal pain, f/c, urinary symptoms, hematuria pt is a 45yo female no pertinent PMH presenting to the ED complaining of abnormal lab result. pt states "I had flu-like symptoms in mid september, they went away but now I'm experiencing generalized weakness, malaise, and body aches; the weakness has progressively worsened over the past 2 weeks and I have persistent nausea and unintentional weight loss of 6-8lbs." pt also complaining of intermittent palpitations and "sharp" chest pains, pt evaluated by outpatient MD, prescribed propanolol for palpitations but has not picked up prescription yet, pt outpatient labs also showed TSH <.01, pt urged to come to ED by outpatient MD. pt A&Ox3 gross neuro intact, no difficulty speaking in complete sentences, pulses x 4, abdomen soft nontender nondistended, skin intact. pt denies sob, ha, abdominal pain, f/c, urinary symptoms, hematuria

## 2024-10-03 NOTE — H&P ADULT - PROBLEM SELECTOR PLAN 1
- Unclear etiology. She does take multitude of supplements which are over the counter. Most significant seems to be Nutrafol of which she has been on for many months. Prior years screening TSH WNL. Doesn't seem to have a family history of autoimmune issues  - House endocrine consulted  - Robison-Wartofsky Score 30  - Needs further symptom management

## 2024-10-03 NOTE — ED PROVIDER NOTE - ATTENDING CONTRIBUTION TO CARE
Guerrero Perkins MD (Attending Physician):    I performed a history and physical exam of the patient and discussed their management with the resident/fellow/ACP/student. I have reviewed the resident/fellow/ACP/student note and agree with the documented findings and plan of care, except as noted. I have personally performed a substantive portion of the visit including all aspects of the medical decision making. My medical decision making and observations are found below. Please refer to any progress notes for updates on clinical course.    HPI:  44-year-old female with no past medical history, surgical history of  and appendectomy, presents today for generalized bodyaches and feeling weak.  She notes that on  she had flulike symptoms for roughly 5 days and afterwards started feeling very weak.  Roughly over the past 2 weeks she has been getting progressively more weak, notes intermittent palpitations, and sharp chest pains worsening over the past 5 days, and notes that her pains throughout her entire body have been getting worse.  Endorses 8lb weight loss, nausea as well.  She recently saw her PCP who took labs on her and found a TSH of less than 0.01, a T3 of 14.3, and a T4 of 6.4, as seen on the [facility] HIE which was just resulted yesterday.  She notes that her PCP prescribed her propranolol but she has been unable to take it as she has not picked it up from the pharmacy.  Notes that her generalized weakness has gotten worse, can barely walk now.  She denies fever, chills, night sweats, shortness of breath, diarrhea, constipation, pedal edema, brittle hair/skin.    PE:  GEN - +In mild discomfort, +appears fatigued, A&Ox3  HEAD - NC/AT  EYES - PERRL, EOMI  ENT - Airway patent, mucous membranes moist  NECK - Supple, +minimal TTP over thyroid with slight enlargement, no obvious goiter  PULMONARY - CTA b/l, symmetric breath sounds, no W/R/R  CARDIAC - +S1S2, RRR, no M/G/R, no JVD  ABDOMEN - +BS, ND, +diffusely TTP, soft, no guarding, no rebound, no masses, no rigidity   - No CVA TTP b/l  EXTREMITIES - FROM, symmetric pulses, no edema  SKIN - No rash or bruising  NEUROLOGIC - Alert, speech clear, CN II-XII grossly intact, no pronator drift, +unable to assess gait at this time, strength and sensation grossly intact, FTN negative b/l  PSYCH - Normal mood/affect, normal insight    MDM:  DDx includes, but not limited to: hyperthyroidism/thyrotoxicosis, viral syndrome, anemia, electrolyte derangement, ACS, PTX, PNA, PE, pancreatitis, UTI, kidney stone, diverticulitis. Low suspicion for thyroid storm based on pt's vital signs. ekg, cxr, CTA chest, CT a/p, RUQ US, labs including repeat TSH/T3/T4, tylenol, zofran, IVF. Will most likely require admission.

## 2024-10-03 NOTE — H&P ADULT - PROBLEM SELECTOR PLAN 2
- Has Holter monitor placed prior to admission with EKG showing PVCs. She has palpitations  - Monitor on telemetry for PVC burden  - Cardiology consulted

## 2024-10-03 NOTE — H&P ADULT - ASSESSMENT
44F with no PMHx presenting for several weeks of feeling unwell, myalgia, weakness, anxiety, and palpitations. She has noted Holter monitor with possible PVCs (seen on EKG here). Outpatient labs showing hyperthyroidism -- suppressed TSH and elevated T3/T4. Patient admitted for further management.

## 2024-10-03 NOTE — ED PROVIDER NOTE - CLINICAL SUMMARY MEDICAL DECISION MAKING FREE TEXT BOX
44-year-old female with no past medical history, surgical history of  and appendectomy, presents today for generalized bodyaches and feeling weak.      Physical exam remarkable for general tenderness to palpation throughout, mildly pale female uncomfortable appearing    Differential diagnosis includes but is not limited to primary hyperthyroidism and its underlying causes given the elevated T3 and T4 and the decreased TSH levels, there are no indications that the patient may be in thyroid storm right now according to the stable vitals and Robison & Wartofsky critieria is less than 25 points so unlikely to represent thyroid storm.  Other differentials include ACS given the chest pain and palpitations, infectious etiology given the recent generalized bodyaches and patient's reported history of flulike symptoms, dehydration, electrolyte abnormality, dysrhythmia    Plan includes labs and imaging for the above, symptomatic management as necessary and reassess

## 2024-10-03 NOTE — CONSULT NOTE ADULT - ASSESSMENT
44F with no PMHx presenting with myalgias, headaches, chest pain, tremulousness, n/v, found to have thyrotoxicosis.    #Thyrotoxicosis  Patient reports having a sore throat early September 2024, also noted to have anterior neck pain at that time as well. This was followed by progressively worsening myalgias, weakness, fatigue, chest pain, palpitations. She also lost 6 lb in the past few weeks. Reports heat intolerance, anxiety. She no longer has anterior neck pain.  Denies hx of lithium, amiodarone use. She used to take nutrafol which has biotin but stopped taking 3 days ago.  Denies pregnancy  Family hx: Father has amiodarone induced hypothyroidism    TSH <0.01, TT3 292, TT4 19.3    History of sore throat and neck pain suggestive of possibly thyroiditis as the cause of thyrotoxicosis. Other differential diagnoses include Graves disease, toxic adenoma/MNG.    Recommendations:  - Check Free T4  - recommend ibuprofen 600mg TID standing, can uptitrate up to QID if needed for pain control   - recommend atenolol 25mg daily given bothersome symptoms of hyperthyroidism (anxiety, tremors, palpitations)  - check TSH receptor antibodies, Thyroid stimulating immunoglobulin  - check thyroid US with doppler flow  - will hold off anti-thyroidal medication given high suspicion for thyroiditis  - patient no longer has neck pain/tenderness, will hold off steroids.  - should have TSH, FT4 repeated outpatient in 4 weeks  - Follow up with endocrinology:   - of note, patient received IV contrast    Discussed with primary team    Brice Fernández MD  Endocrine Fellow  Can be reached via teams. For follow up questions, discharge recommendations, or new consults, please call answering service at [phone] (weekdays); [phone] (nights/weekends). 44F with no PMHx presenting with myalgias, headaches, chest pain, tremulousness, n/v, found to have thyrotoxicosis.    #Thyrotoxicosis  Patient reports having a sore throat early September 2024, also noted to have anterior neck pain at that time as well. This was followed by progressively worsening myalgias, weakness, fatigue, chest pain, palpitations. She also lost 6 lb in the past few weeks. Reports heat intolerance, anxiety. She no longer has anterior neck pain.  Denies hx of lithium, amiodarone use. She used to take nutrafol which has biotin but stopped taking 3 days ago.  Denies pregnancy  Family hx: Father has amiodarone induced hypothyroidism    10/2: TSH <0.01, FT4 6.2  10/3: TSH <0.01, TT3 292, TT4 19.3    History of sore throat and neck pain suggestive of possibly thyroiditis as the cause of thyrotoxicosis. Other differential diagnoses include Graves disease, toxic adenoma/MNG.    Recommendations:  - Check Free T4  - recommend ibuprofen 600mg TID standing, can uptitrate up to QID if needed for pain control   - recommend atenolol 25mg daily given bothersome symptoms of hyperthyroidism (anxiety, tremors, palpitations)  - check TSH receptor antibodies, Thyroid stimulating immunoglobulin  - check thyroid US with doppler flow  - will hold off anti-thyroidal medication given high suspicion for thyroiditis  - patient no longer has neck pain/tenderness, will hold off steroids.  - should have TSH, FT4 repeated outpatient in 4 weeks  - Follow up with endocrinology:   - of note, patient received IV contrast    Discussed with primary team    Brice Fernández MD  Endocrine Fellow  Can be reached via teams. For follow up questions, discharge recommendations, or new consults, please call answering service at [phone] (weekdays); [phone] (nights/weekends). 44F with no PMHx presenting with myalgias, headaches, chest pain, tremulousness, n/v, found to have thyrotoxicosis.    #Thyrotoxicosis  Patient reports having a sore throat early September 2024, also noted to have anterior neck pain at that time as well. This was followed by progressively worsening myalgias, weakness, fatigue, chest pain, palpitations. She also lost 6 lb in the past few weeks. Reports heat intolerance, anxiety. She no longer has anterior neck pain.  Denies hx of lithium, amiodarone use. She used to take nutrafol which has biotin but stopped taking 3 days ago.  Denies pregnancy  Family hx: Father has amiodarone induced hypothyroidism    10/2: TSH <0.01, FT4 6.2  10/3: TSH <0.01, TT3 292, TT4 19.3    History of sore throat and neck pain suggestive of possibly thyroiditis as the cause of thyrotoxicosis. Other differential diagnoses include Graves disease, toxic adenoma/MNG.    Recommendations:  - Check Free T4  - recommend ibuprofen 600mg TID standing, can uptitrate up to QID if needed for pain control   - recommend atenolol 25mg daily given bothersome symptoms of hyperthyroidism (anxiety, tremors, palpitations)  - check TSH receptor antibodies, Thyroid stimulating immunoglobulin, TPO antibodies  - check thyroid US with doppler flow  - will hold off anti-thyroidal medication given high suspicion for thyroiditis  - patient no longer has neck pain/tenderness, will hold off steroids.  - should have TSH, FT4 repeated outpatient in 4 weeks  - Follow up with endocrinology:   - of note, patient received IV contrast    Discussed with primary team    Brice Fernández MD  Endocrine Fellow  Can be reached via teams. For follow up questions, discharge recommendations, or new consults, please call answering service at [phone] (weekdays); [phone] (nights/weekends). 44F with no PMHx presenting with myalgias, headaches, chest pain, tremulousness, n/v, found to have thyrotoxicosis.    #Thyrotoxicosis  Patient reports having a sore throat early September 2024, also noted to have anterior neck pain at that time as well. This was followed by progressively worsening myalgias, weakness, fatigue, chest pain, palpitations. She also lost 6 lb in the past few weeks. Reports heat intolerance, anxiety. She no longer has anterior neck pain.  Denies hx of lithium, amiodarone use. She used to take nutrafol which has biotin but stopped taking 3 days ago.  Denies pregnancy  Family hx: Father has amiodarone induced hypothyroidism    10/2: TSH <0.01, FT4 6.2  10/3: TSH <0.01, TT3 292, TT4 19.3    History of sore throat and neck pain suggestive of possibly thyroiditis as the cause of thyrotoxicosis. Other differential diagnoses include Graves disease, toxic adenoma/MNG.    Recommendations:  - Check Free T4  - recommend ibuprofen 600mg TID standing, can uptitrate up to QID if needed for pain control   - recommend atenolol 25mg daily given bothersome symptoms of hyperthyroidism (anxiety, tremors, palpitations)  - check TSH receptor antibodies, Thyroid stimulating immunoglobulin, TPO antibodies  - check thyroid US with doppler flow  - will hold off anti-thyroidal medication given high suspicion for thyroiditis  - patient no longer has neck pain/tenderness, will hold off steroids.  - should have TSH, FT4 repeated outpatient in 4 weeks  - Follow up with endocrinology: [facility] endocrinology ([phone])  - of note, patient received IV contrast    Discussed with primary team    Brice Fernández MD  Endocrine Fellow  Can be reached via teams. For follow up questions, discharge recommendations, or new consults, please call answering service at [phone] (weekdays); [phone] (nights/weekends).

## 2024-10-03 NOTE — CONSULT NOTE ADULT - SUBJECTIVE AND OBJECTIVE BOX
NOTE INCOMPLETE/ IN PROGRESS  *Please wait for attending attestation for official recommendations.     HPI:  44F with no PMHx presenting for several weeks of feeling unwell, myalgia, and weakness. Patient states on approximately 2024 she had a sore throat. Since then began to feel generalized and very non-specific symptoms. She noted her hair falling out, possible changes to her skin? Also with diffuse body aches of which is notable in her abdomen. Feeling more weak and tired despite being active. No new medications. She states because she felt unwell she went to [email or web address] who prescribed her Augmentin for presumed strep infection? She finished 5 days. She continued not to feel well and went to her PCP. Given palpitations she was placed on a Holter which showed PVCs. Patient is presenting today because she has noted suppressed TSH. Patient feels very anxious with sensation that she is jumping out of her skin. Seems to have difficulty focusing.    In the ED given abdominal pain she had a CTA Chest and CT A&P. There is no PE and no acute findings on abdomen and pelvis. She had s RUQ sono which showed cholelithiasis, but no signs of cholecystitis.     Complaining of headache now, though improved with Tylenol. (03 Oct 2024 10:11)      Consulted for:    PAST MEDICAL & SURGICAL HISTORY:  No pertinent past medical history      History of       History of tonsillectomy          FAMILY HISTORY:  Family history of MI (myocardial infarction) (Father)        Social History:    Outpatient Medications:    MEDICATIONS  (STANDING):    MEDICATIONS  (PRN):  acetaminophen     Tablet .. 650 milliGRAM(s) Oral every 6 hours PRN Mild Pain (1 - 3)  ibuprofen  Tablet. 600 milliGRAM(s) Oral three times a day PRN Moderate Pain (4 - 6), Severe Pain (7 - 10)  melatonin 3 milliGRAM(s) Oral at bedtime PRN Insomnia  ondansetron Injectable 4 milliGRAM(s) IV Push every 8 hours PRN Nausea and/or Vomiting      Allergies    No Known Allergies    Intolerances      Review of Systems:  Constitutional: No fever  Eyes: No blurry vision  Neuro: No tremors  HEENT: No pain  Cardiovascular: No chest pain, palpitations  Respiratory: No SOB, no cough  GI: No nausea, vomiting, abdominal pain  : No dysuria  Skin: no rash  Psych: no depression  Endocrine: no polyuria, polydipsia  Hem/lymph: no swelling  Osteoporosis: no fractures    ALL OTHER SYSTEMS REVIEWED AND NEGATIVE    UNABLE TO OBTAIN    PHYSICAL EXAM:  VITALS: T(C): 36.7 (10-03-24 @ 08:41)  T(F): 98.1 (10-03-24 @ 08:41), Max: 98.1 (10-03-24 @ 08:41)  HR: 79 (10-03-24 @ 08:41) (75 - 91)  BP: 110/62 (10-03-24 @ 08:41) (104/68 - 120/80)  RR:  (15 - 20)  SpO2:  (96% - 99%)  Wt(kg): --  GENERAL: NAD, well-groomed, well-developed  EYES: No proptosis, no lid lag, anicteric  HEENT:  Atraumatic, Normocephalic, moist mucous membranes  THYROID: Normal size, no palpable nodules  RESPIRATORY: Clear to auscultation bilaterally; No rales, rhonchi, wheezing  CARDIOVASCULAR: Regular rate and rhythm; No murmurs; no peripheral edema  GI: Soft, nontender, non distended, normal bowel sounds  SKIN: Dry, intact, No rashes or lesions  MUSCULOSKELETAL: Full range of motion, normal strength  NEURO: sensation intact, extraocular movements intact, no tremor  PSYCH: Alert and oriented x 3, normal affect, normal mood  CUSHING'S SIGNS: no striae      CAPILLARY BLOOD GLUCOSE                                8.9    5.20  )-----------( 229      ( 03 Oct 2024 06:31 )             27.7       10-03    140  |  106  |  20  ----------------------------<  99  4.0   |  23  |  0.49[L]    eGFR: 119    Ca    9.0      10-03  Mg     2.1     10-03    TPro  6.8  /  Alb  3.5  /  TBili  0.1[L]  /  DBili  x   /  AST  17  /  ALT  19  /  AlkPhos  74  10-03      Thyroid Function Tests:  10-03 @ 00:22 TSH <0.01 FreeT4 -- T3 292 Anti TPO -- Anti Thyroglobulin Ab -- TSI --          10-03 Chol -- Direct LDL -- LDL calculated -- HDL -- Trig 67    Radiology:              NOTE INCOMPLETE/ IN PROGRESS  *Please wait for attending attestation for official recommendations.     HPI:  44F with no PMHx presenting for several weeks of feeling unwell, myalgia, and weakness. Patient states on approximately 2024 she had a sore throat. Since then began to feel generalized and very non-specific symptoms. She noted her hair falling out, possible changes to her skin? Also with diffuse body aches of which is notable in her abdomen. Feeling more weak and tired despite being active. No new medications. She states because she felt unwell she went to [facility] who prescribed her Augmentin for presumed strep infection? She finished 5 days. She continued not to feel well and went to her PCP. Given palpitations she was placed on a Holter which showed PVCs. Patient is presenting today because she has noted suppressed TSH. Patient feels very anxious with sensation that she is jumping out of her skin. Seems to have difficulty focusing. In the ED given abdominal pain she had a CTA Chest and CT A&P. There is no PE and no acute findings on abdomen and pelvis. She had RUQ sono which showed cholelithiasis, but no signs of cholecystitis. Complaining of headache now, though improved with Tylenol. (03 Oct 2024 10:11)      Consulted for: Thyrotoxicosis    Endocrine history:  Patient reports having a sore throat early 2024, also noted to have anterior neck pain at that time as well. This was followed by progressively worsening myalgias, weakness, fatigue, chest pain, palpitations. She also lost 6 lb in the past few weeks. Reports heat intolerance, anxiety, n/v, tremors. She no longer has anterior neck pain.  Denies hx of lithium, amiodarone use. She used to take nutrafol which has biotin but stopped taking 3 days ago.  Denies pregnancy.  She has been taking motrin 2-3x/day at home.     Family hx: Father has amiodarone induced hypothyroidism  Social hx: no alcohol, no smoking      PAST MEDICAL & SURGICAL HISTORY:  No pertinent past medical history      History of       History of tonsillectomy          FAMILY HISTORY:  Family history of MI (myocardial infarction) (Father)        Social History:   no alcohol, no smoking      Outpatient Medications:  Home Medications:  ibuprofen 600 mg oral tablet: 1 tab(s) orally every 8 hours as needed (03 Oct 2024 09:11)  Magnesium Glycinate 500mg Tablet: 1 tablet orally once a day (03 Oct 2024 09:03)  Nutrafol Tablet: 4 tablets orally once a day (03 Oct 2024 09:03)  OTC Flonase Nasal Spray: 1 spray instilled into each nostril 2 times a day (03 Oct 2024 09:03)  Vitamin C 1,000mg Tablet: 1 tablet orally once a day (03 Oct 2024 09:03)  Vitamin D3+K2 Tablet: 1 tablet orally once a day (03 Oct 2024 09:03)    MEDICATIONS  (STANDING):    MEDICATIONS  (PRN):  acetaminophen     Tablet .. 650 milliGRAM(s) Oral every 6 hours PRN Mild Pain (1 - 3)  ibuprofen  Tablet. 600 milliGRAM(s) Oral three times a day PRN Moderate Pain (4 - 6), Severe Pain (7 - 10)  melatonin 3 milliGRAM(s) Oral at bedtime PRN Insomnia  ondansetron Injectable 4 milliGRAM(s) IV Push every 8 hours PRN Nausea and/or Vomiting      Allergies    No Known Allergies    Intolerances      Review of Systems:  Constitutional: + fatigue, myalgias, dizziness  Eyes: No blurry vision  Neuro: +tremors  HEENT: +pain  Cardiovascular: + chest pain, palpitations  Respiratory: No SOB, no cough  GI: + nausea, vomiting, abdominal pain  : No dysuria  Skin: no rash  Psych: anxiety. no depression  Endocrine: no polyuria, polydipsia  Hem/lymph: no swelling  Osteoporosis: no fractures    ALL OTHER SYSTEMS REVIEWED AND NEGATIVE      PHYSICAL EXAM:  VITALS: T(C): 36.7 (10-03-24 @ 08:41)  T(F): 98.1 (10-03-24 @ 08:41), Max: 98.1 (10-03-24 @ 08:41)  HR: 79 (10-03-24 @ 08:41) (75 - 91)  BP: 110/62 (10-03-24 @ 08:41) (104/68 - 120/80)  RR:  (15 - 20)  SpO2:  (96% - 99%)  Wt(kg): --  GENERAL: mild distress  EYES: No proptosis, no lid lag, anicteric  HEENT:  Atraumatic, Normocephalic, moist mucous membranes  THYROID: non-enlarged, nontender  RESPIRATORY: Clear to auscultation bilaterally; No rales, rhonchi, wheezing  CARDIOVASCULAR: Regular rate and rhythm; No murmurs; no peripheral edema  GI: Soft, mildly tender  SKIN: Dry, intact, No rashes or lesions  MUSCULOSKELETAL: Full range of motion, normal strength  NEURO: tremulousness. sensation intact, extraocular movements intact  PSYCH: Alert and oriented x 3, anxious  CUSHING'S SIGNS: no striae      CAPILLARY BLOOD GLUCOSE                                8.9    5.20  )-----------( 229      ( 03 Oct 2024 06:31 )             27.7       10-03    140  |  106  |  20  ----------------------------<  99  4.0   |  23  |  0.49[L]    eGFR: 119    Ca    9.0      10-03  Mg     2.1     10-03    TPro  6.8  /  Alb  3.5  /  TBili  0.1[L]  /  DBili  x   /  AST  17  /  ALT  19  /  AlkPhos  74  10-03      Thyroid Function Tests:  10-03 @ 00:22 TSH <0.01 FreeT4 -- T3 292 Anti TPO -- Anti Thyroglobulin Ab -- TSI --          10-03 Chol -- Direct LDL -- LDL calculated -- HDL -- Trig 67    Radiology:

## 2024-10-04 ENCOUNTER — TRANSCRIPTION ENCOUNTER (OUTPATIENT)
Age: 44
End: 2024-10-04

## 2024-10-04 ENCOUNTER — APPOINTMENT (OUTPATIENT)
Dept: ENDOCRINOLOGY | Facility: CLINIC | Age: 44
End: 2024-10-04

## 2024-10-04 LAB
THYROPEROXIDASE AB SERPL-ACNC: 21.3 IU/ML — SIGNIFICANT CHANGE UP
TSH RECEP AB FLD-ACNC: <1.1 IU/L — SIGNIFICANT CHANGE UP

## 2024-10-04 PROCEDURE — 99239 HOSP IP/OBS DSCHRG MGMT >30: CPT

## 2024-10-04 PROCEDURE — 99232 SBSQ HOSP IP/OBS MODERATE 35: CPT | Mod: GC

## 2024-10-04 PROCEDURE — 76536 US EXAM OF HEAD AND NECK: CPT | Mod: 26

## 2024-10-04 RX ORDER — FAMOTIDINE 40 MG
1 TABLET ORAL
Qty: 60 | Refills: 0
Start: 2024-10-04 | End: 2024-11-02

## 2024-10-04 RX ORDER — PANTOPRAZOLE SODIUM 40 MG/1
40 TABLET, DELAYED RELEASE ORAL
Refills: 0 | Status: DISCONTINUED | OUTPATIENT
Start: 2024-10-04 | End: 2024-10-05

## 2024-10-04 RX ORDER — METOCLOPRAMIDE HCL 5 MG
5 TABLET ORAL ONCE
Refills: 0 | Status: COMPLETED | OUTPATIENT
Start: 2024-10-04 | End: 2024-10-04

## 2024-10-04 RX ORDER — ACETAMINOPHEN 325 MG
1000 TABLET ORAL ONCE
Refills: 0 | Status: COMPLETED | OUTPATIENT
Start: 2024-10-04 | End: 2024-10-04

## 2024-10-04 RX ORDER — ATENOLOL 25 MG/1
1 TABLET ORAL
Qty: 30 | Refills: 1
Start: 2024-10-04 | End: 2024-12-02

## 2024-10-04 RX ORDER — PREDNISONE 5 MG/1
30 TABLET ORAL
Refills: 0 | Status: DISCONTINUED | OUTPATIENT
Start: 2024-10-05 | End: 2024-10-05

## 2024-10-04 RX ORDER — 5-HYDROXYTRYPTOPHAN (5-HTP) 100 MG
5 TABLET,DISINTEGRATING ORAL AT BEDTIME
Refills: 0 | Status: DISCONTINUED | OUTPATIENT
Start: 2024-10-04 | End: 2024-10-05

## 2024-10-04 RX ADMIN — KETOROLAC TROMETHAMINE 30 MILLIGRAM(S): 10 TABLET, FILM COATED ORAL at 13:33

## 2024-10-04 RX ADMIN — Medication 20 MILLIGRAM(S): at 08:32

## 2024-10-04 RX ADMIN — Medication 20 MILLIGRAM(S): at 17:15

## 2024-10-04 RX ADMIN — Medication 5 MILLIGRAM(S): at 14:43

## 2024-10-04 RX ADMIN — KETOROLAC TROMETHAMINE 30 MILLIGRAM(S): 10 TABLET, FILM COATED ORAL at 14:00

## 2024-10-04 RX ADMIN — KETOROLAC TROMETHAMINE 30 MILLIGRAM(S): 10 TABLET, FILM COATED ORAL at 07:00

## 2024-10-04 RX ADMIN — ATENOLOL 25 MILLIGRAM(S): 25 TABLET ORAL at 08:31

## 2024-10-04 RX ADMIN — KETOROLAC TROMETHAMINE 30 MILLIGRAM(S): 10 TABLET, FILM COATED ORAL at 19:47

## 2024-10-04 RX ADMIN — Medication 5 MILLIGRAM(S): at 19:46

## 2024-10-04 RX ADMIN — Medication 4 MILLIGRAM(S): at 08:57

## 2024-10-04 RX ADMIN — KETOROLAC TROMETHAMINE 30 MILLIGRAM(S): 10 TABLET, FILM COATED ORAL at 20:27

## 2024-10-04 RX ADMIN — Medication 1000 MILLIGRAM(S): at 15:00

## 2024-10-04 RX ADMIN — Medication 400 MILLIGRAM(S): at 14:44

## 2024-10-04 NOTE — PROGRESS NOTE ADULT - SUBJECTIVE AND OBJECTIVE BOX
Patient is a 44y old  Female who presents with a chief complaint of Anxiety/Pain (04 Oct 2024 07:56)      SUBJECTIVE / OVERNIGHT EVENTS: Patient seen and examined at bedside. No acute events overnight. Overall headache is improving. No major events on tele. Feels anxious, didn't sleep last night. She wants to go home.    MEDICATIONS  (STANDING):  atenolol  Tablet 25 milliGRAM(s) Oral daily  famotidine    Tablet 20 milliGRAM(s) Oral two times a day  ketorolac   Injectable 30 milliGRAM(s) IV Push every 6 hours    MEDICATIONS  (PRN):  acetaminophen     Tablet .. 650 milliGRAM(s) Oral every 6 hours PRN Mild Pain (1 - 3)  HYDROmorphone  Injectable 0.25 milliGRAM(s) IV Push every 4 hours PRN Breakthrough  melatonin 3 milliGRAM(s) Oral at bedtime PRN Insomnia  ondansetron Injectable 4 milliGRAM(s) IV Push every 8 hours PRN Nausea and/or Vomiting      CAPILLARY BLOOD GLUCOSE        I&O's Summary      PHYSICAL EXAM:  Vital Signs Last 24 Hrs  T(C): 36.7 (04 Oct 2024 08:29), Max: 36.8 (04 Oct 2024 05:29)  T(F): 98.1 (04 Oct 2024 08:29), Max: 98.2 (04 Oct 2024 05:29)  HR: 86 (04 Oct 2024 08:29) (79 - 96)  BP: 107/64 (04 Oct 2024 08:29) (103/69 - 118/77)  BP(mean): --  RR: 18 (04 Oct 2024 08:29) (17 - 18)  SpO2: 96% (04 Oct 2024 08:29) (95% - 98%)    Parameters below as of 04 Oct 2024 08:29  Patient On (Oxygen Delivery Method): room air      LABS:                        8.9    5.20  )-----------( 229      ( 03 Oct 2024 06:31 )             27.7     10-03    140  |  106  |  20  ----------------------------<  99  4.0   |  23  |  0.49[L]    Ca    9.0      03 Oct 2024 00:23  Mg     2.1     10-03    TPro  6.8  /  Alb  3.5  /  TBili  0.1[L]  /  DBili  x   /  AST  17  /  ALT  19  /  AlkPhos  74  10-03    PT/INR - ( 03 Oct 2024 00:21 )   PT: 10.7 sec;   INR: 0.94 ratio         PTT - ( 03 Oct 2024 00:21 )  PTT:28.9 sec  CARDIAC MARKERS ( 03 Oct 2024 00:23 )  x     / x     / x     / x     / 1.4 ng/mL      Urinalysis Basic - ( 03 Oct 2024 00:31 )    Color: Yellow / Appearance: Clear / SG: >1.030 / pH: x  Gluc: x / Ketone: Trace mg/dL  / Bili: Negative / Urobili: 1.0 mg/dL   Blood: x / Protein: Negative mg/dL / Nitrite: Negative   Leuk Esterase: Negative / RBC: 2 /HPF / WBC 1 /HPF   Sq Epi: x / Non Sq Epi: 2 /HPF / Bacteria: Negative /HPF          RADIOLOGY & ADDITIONAL TESTS:  Results Reviewed:   Imaging Personally Reviewed:  Electrocardiogram Personally Reviewed:    COORDINATION OF CARE:  Care Discussed with Consultants/Other Providers [Y/N]:  Prior or Outpatient Records Reviewed [Y/N]:

## 2024-10-04 NOTE — PROGRESS NOTE ADULT - SUBJECTIVE AND OBJECTIVE BOX
ENDOCRINE FOLLOW UP     Chief Complaint:     History:     MEDICATIONS  (STANDING):  atenolol  Tablet 25 milliGRAM(s) Oral daily  famotidine    Tablet 20 milliGRAM(s) Oral two times a day  ketorolac   Injectable 30 milliGRAM(s) IV Push every 6 hours    MEDICATIONS  (PRN):  acetaminophen     Tablet .. 650 milliGRAM(s) Oral every 6 hours PRN Mild Pain (1 - 3)  HYDROmorphone  Injectable 0.25 milliGRAM(s) IV Push every 4 hours PRN Breakthrough  melatonin 3 milliGRAM(s) Oral at bedtime PRN Insomnia  ondansetron Injectable 4 milliGRAM(s) IV Push every 8 hours PRN Nausea and/or Vomiting      Allergies    No Known Allergies    Intolerances        ROS: All other systems reviewed and negative    PHYSICAL EXAM:  VITALS: T(C): 36.7 (10-04-24 @ 08:29)  T(F): 98.1 (10-04-24 @ 08:29), Max: 98.2 (10-04-24 @ 05:29)  HR: 86 (10-04-24 @ 08:29) (79 - 96)  BP: 107/64 (10-04-24 @ 08:29) (103/69 - 118/77)  RR:  (17 - 18)  SpO2:  (95% - 98%)  Wt(kg): --  GENERAL: NAD, resting comfortably   EYES: No proptosis,  anicteric  HEENT:  Atraumatic, Normocephalic, moist mucous membranes  RESPIRATORY: Nonlabored respirations on room air, normal rate/effort   CARDIOVASCULAR: Regular rate and rhythm; No murmurs  GI: Soft, nontender, non distended, normal bowel sounds  NEURO: AOx3, moves all extremities spontaenuously   PSYCH:  reactive affect, euthymic mood        10-03    140  |  106  |  20  ----------------------------<  99  4.0   |  23  |  0.49[L]    eGFR: 119    Ca    9.0      10-03  Mg     2.1     10-03    TPro  6.8  /  Alb  3.5  /  TBili  0.1[L]  /  DBili  x   /  AST  17  /  ALT  19  /  AlkPhos  74  10-03          Thyroid Stimulating Hormone, Serum: <0.01 uIU/mL (10-03-24 @ 00:22)     Admitted for: Thyrotoxicosis without thyroid storm        Following for: Thyrotoxicosis    Subjective:   Patient still remains in severe pain with headache and nausea      MEDICATIONS  (STANDING):  atenolol  Tablet 25 milliGRAM(s) Oral daily  famotidine    Tablet 20 milliGRAM(s) Oral two times a day  ketorolac   Injectable 30 milliGRAM(s) IV Push every 6 hours    MEDICATIONS  (PRN):  acetaminophen     Tablet .. 650 milliGRAM(s) Oral every 6 hours PRN Mild Pain (1 - 3)  HYDROmorphone  Injectable 0.25 milliGRAM(s) IV Push every 4 hours PRN Breakthrough  melatonin 3 milliGRAM(s) Oral at bedtime PRN Insomnia  ondansetron Injectable 4 milliGRAM(s) IV Push every 8 hours PRN Nausea and/or Vomiting      Allergies    No Known Allergies    Intolerances          PHYSICAL EXAM:  VITALS: T(C): 36.7 (10-04-24 @ 08:29)  T(F): 98.1 (10-04-24 @ 08:29), Max: 98.2 (10-04-24 @ 05:29)  HR: 86 (10-04-24 @ 08:29) (79 - 96)  BP: 107/64 (10-04-24 @ 08:29) (103/69 - 118/77)  RR:  (17 - 18)  SpO2:  (95% - 98%)  Wt(kg): --  GENERAL: moderate distress  EYES: No proptosis, no lid lag, anicteric  RESPIRATORY: no respiratory distress  CARDIOVASCULAR: Regular rate and rhythm  GI: Soft, nontender  EXT: b/l feet without wounds, 2+ pulses  PSYCH: Alert and oriented x 3, very anxious              10-03    140  |  106  |  20  ----------------------------<  99  4.0   |  23  |  0.49[L]    eGFR: 119    Ca    9.0      10-03  Mg     2.1     10-03    TPro  6.8  /  Alb  3.5  /  TBili  0.1[L]  /  DBili  x   /  AST  17  /  ALT  19  /  AlkPhos  74  10-03      Thyroid Function Tests:  10-03 @ 00:22 TSH <0.01 FreeT4 4.9 T3 292 Anti TPO -- Anti Thyroglobulin Ab -- TSI --

## 2024-10-04 NOTE — PROGRESS NOTE ADULT - PROBLEM SELECTOR PLAN 1
- Thyrotoxicosis 2/2 to thyroiditis from viral infection  - House endocrine consulted  - Check thyroid antibodies  - Thyroid US  - Start Atenolol (instead of propranolol for ease of dosing)  - Standing NSAIDS  - Patient to follow up outpatient with Sar[facility] webster)

## 2024-10-04 NOTE — DISCHARGE NOTE PROVIDER - PROVIDER TOKENS
PROVIDER:[TOKEN:[[id number]],FOLLOWUP:[1 week],ESTABLISHEDPATIENT:[T]],PROVIDER:[TOKEN:[[id number]],FOLLOWUP:[Routine],ESTABLISHEDPATIENT:[T]] PROVIDER:[TOKEN:[[id number]],FOLLOWUP:[Routine],ESTABLISHEDPATIENT:[T]],PROVIDER:[TOKEN:[[id number]],FOLLOWUP:[1 week],ESTABLISHEDPATIENT:[T]]

## 2024-10-04 NOTE — HISTORY OF PRESENT ILLNESS
[FreeTextEntry1] : Ms. ZULUAGA  is a 44 year old  female  who presents for initial endocrine evaluation. She presents with regard to a history of recently noted low end tsh of late she has noted increasing fatigue, palpitations, and an overall sensation of not feeling well.   Additional medical history includes that of  TSH from 10/01/2024 returned at <0.01 with latest prior value at 1.80 back in January of 2023. Too latest labs showed Free T4 at 14.3 and Free T4 too elevated at 6.4  Ros  Soc Hx   Allg:

## 2024-10-04 NOTE — PROGRESS NOTE ADULT - ASSESSMENT
44F with no PMHx presenting for several weeks of feeling unwell, myalgia, weakness, anxiety, and palpitations. She has noted Holter monitor with possible PVCs (seen on EKG here). Outpatient labs showing hyperthyroidism -- suppressed TSH and elevated T3/T4. Patient admitted for further management. Per endo likely thyroiditis from recent viral infection.

## 2024-10-04 NOTE — DISCHARGE NOTE PROVIDER - NSDCMRMEDTOKEN_GEN_ALL_CORE_FT
ibuprofen 600 mg oral tablet: 1 tab(s) orally every 8 hours as needed  Magnesium Glycinate 500mg Tablet: 1 tablet orally once a day  Nutrafol Tablet: 4 tablets orally once a day  OTC Flonase Nasal Spray: 1 spray instilled into each nostril 2 times a day  Vitamin C 1,000mg Tablet: 1 tablet orally once a day  Vitamin D3+K2 Tablet: 1 tablet orally once a day   atenolol 25 mg oral tablet: 1 tab(s) orally once a day Per endo can take this instead of propranolol for dosing ease  famotidine 20 mg oral tablet: 1 tab(s) orally 2 times a day  ibuprofen 600 mg oral tablet: 1 tab(s) orally every 8 hours as needed  Magnesium Glycinate 500mg Tablet: 1 tablet orally once a day  Nutrafol Tablet: 4 tablets orally once a day  OTC Flonase Nasal Spray: 1 spray instilled into each nostril 2 times a day  pantoprazole 40 mg oral delayed release tablet: 1 tab(s) orally once a day (before a meal) Take either this or Pepcid  Percocet 5 mg-325 mg oral tablet: 1 tab(s) orally every 4 hours as needed for  severe pain MDD: 6 tabs  predniSONE 10 mg oral tablet: 4 tab(s) orally once a day Taper by 10 mg every 5 days (count starts on 10/5)  Vitamin C 1,000mg Tablet: 1 tablet orally once a day  Vitamin D3+K2 Tablet: 1 tablet orally once a day

## 2024-10-04 NOTE — DISCHARGE NOTE PROVIDER - NSDCFUADDAPPT_GEN_ALL_CORE_FT
Please call the endocrine office if you do not hear from them Please call the endocrine office if you do not hear from them    APPTS ARE READY TO BE MADE: [X] YES    Best Family or Patient Contact (if needed):    Additional Information about above appointments (if needed):    1: Please follow up with primary care/cardiology.  2: Please follow up with endocrinology.    Other comments or requests:    Please call the endocrine office if you do not hear from them    APPTS ARE READY TO BE MADE: [X] YES    Best Family or Patient Contact (if needed):    Additional Information about above appointments (if needed):    1: Please follow up with primary care/cardiology.  2: Please follow up with endocrinology.    Other comments or requests:     Patient was outreached but did not answer. A voicemail was left for the patient to return our call 10/10, 10/11 and 10/14

## 2024-10-04 NOTE — PROVIDER CONTACT NOTE (OTHER) - ACTION/TREATMENT ORDERED:
provider made aware and management made aware. will endorse to day team for meds to be given after breakfast.

## 2024-10-04 NOTE — DISCHARGE NOTE PROVIDER - NSDCFUSCHEDAPPT_GEN_ALL_CORE_FT
[facility]  [facility] [address]  Scheduled Appointment: 10/04/2024    [facility]  [facility] [address]  Scheduled Appointment: 10/11/2024     [facility]  [facility] [address]  Scheduled Appointment: 10/11/2024

## 2024-10-04 NOTE — PATIENT PROFILE ADULT - FALL HARM RISK - RISK INTERVENTIONS

## 2024-10-04 NOTE — PROGRESS NOTE ADULT - ATTENDING COMMENTS
Agree with assessment and plan as above by Dr. Fernández. Reviewed all pertinent labs, glucose values, and imaging studies. Modifications made as indicated above. Pt. with acute thyrotoxicosis. Suspect thyroiditis given recent viral URI and neck pain with slow improving Free T4 level. Started on beta blocker. Still remains symptomatic. Can try on prednisone 30mg daily with taper to see if any benefits in symptoms. Awaiting antibodies to r/o Graves'. Holding off on thioamide therapy for now.     Torres Dunn D.O  [phone]

## 2024-10-04 NOTE — DISCHARGE NOTE PROVIDER - CARE PROVIDERS DIRECT ADDRESSES
[email or web address]

## 2024-10-04 NOTE — PROGRESS NOTE ADULT - ASSESSMENT
44F with no PMHx presenting with myalgias, headaches, chest pain, tremulousness, n/v, found to have thyrotoxicosis.    #Thyrotoxicosis  Patient reports having a sore throat early September 2024, also noted to have anterior neck pain at that time as well. This was followed by progressively worsening myalgias, weakness, fatigue, chest pain, palpitations. She also lost 6 lb in the past few weeks. Reports heat intolerance, anxiety. She no longer has anterior neck pain.  Denies hx of lithium, amiodarone use. She used to take nutrafol which has biotin but stopped taking 3 days ago.  Denies pregnancy  Family hx: Father has amiodarone induced hypothyroidism    10/2: TSH <0.01, FT4 6.2  10/3: TSH <0.01, TT3 292, Free T4 4.9    History of sore throat and neck pain suggestive of possibly thyroiditis as the cause of thyrotoxicosis. Other differential diagnoses include Graves disease, toxic adenoma/MNG.    TUS shows Mildly enlarged, diffusely heterogeneous, lobulated in contour thyroid gland with mildly increased vascularity. There are multifocal, patchy regions of decreased echogenicity throughout both thyroid lobes.     In early thyroiditis, TUS can show increased vascularity, would expect decreased vascularity otherwise.    Recommendations:  - recommend standing NSAIDs (ibuprofen or ketorolac), uptitrate as needed for pain control  - continue atenolol 25mg daily given bothersome symptoms of hyperthyroidism (anxiety, tremors, palpitations)  - TSH receptor antibodies, TSI, TPO in lab, pending  - will hold off anti-thyroidal medication given high suspicion for thyroiditis  - patient no longer has neck pain/tenderness, will hold off steroids.  - should have TSH, FT4 repeated outpatient in 4 weeks  - Follow up with endocrinology: [facility] endocrinology ([phone])  - of note, patient received IV contrast        Brice Fernández MD  Endocrine Fellow  Can be reached via teams. For follow up questions, discharge recommendations, or new consults, please call answering service at [phone] (weekdays); [phone] (nights/weekends). 44F with no PMHx presenting with myalgias, headaches, chest pain, tremulousness, n/v, found to have thyrotoxicosis.    #Thyrotoxicosis  Patient reports having a sore throat early September 2024, also noted to have anterior neck pain at that time as well. This was followed by progressively worsening myalgias, weakness, fatigue, chest pain, palpitations. She also lost 6 lb in the past few weeks. Reports heat intolerance, anxiety. She no longer has anterior neck pain.  Denies hx of lithium, amiodarone use. She used to take nutrafol which has biotin but stopped taking 3 days ago.  Denies pregnancy  Family hx: Father has amiodarone induced hypothyroidism    10/2: TSH <0.01, FT4 6.2  10/3: TSH <0.01, TT3 292, Free T4 4.9    History of sore throat and neck pain suggestive of possibly thyroiditis as the cause of thyrotoxicosis. Other differential diagnoses include Graves disease, toxic adenoma/MNG.    TUS shows Mildly enlarged, diffusely heterogeneous, lobulated in contour thyroid gland with mildly increased vascularity. There are multifocal, patchy regions of decreased echogenicity throughout both thyroid lobes.     In early thyroiditis, TUS can show increased vascularity, would expect decreased vascularity otherwise.    Recommendations:  - Recommend starting prednisone 30mg daily given severity of her symptoms  - continue standing NSAIDs, uptitrate as needed for pain control  - continue atenolol 25mg daily given bothersome symptoms of hyperthyroidism (anxiety, tremors, palpitations)  - follow up TSH receptor antibodies, TSI, TPO in lab, pending  - will hold off anti-thyroidal medication given high suspicion for thyroiditis  - should have TSH, FT4 repeated outpatient in 4 weeks  - Follow up with endocrinology: [address] [facility] endocrinology ([phone])  - of note, patient received IV contrast        Brice Fernández MD  Endocrine Fellow  Can be reached via teams. For follow up questions, discharge recommendations, or new consults, please call answering service at [phone] (weekdays); [phone] (nights/weekends).
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
## 2024-10-04 NOTE — DISCHARGE NOTE PROVIDER - HOSPITAL COURSE
HPI:  44F with no PMHx presenting for several weeks of feeling unwell, myalgia, and weakness. Patient states on approximately September 7, 2024 she had a sore throat. Since then began to feel generalized and very non-specific symptoms. She noted her hair falling out, possible changes to her skin? Also with diffuse body aches of which is notable in her abdomen. Feeling more weak and tired despite being active. No new medications. She states because she felt unwell she went to [employer] who prescribed her Augmentin for presumed strep infection? She finished 5 days. She continued not to feel well and went to her PCP. Given palpitations she was placed on a Holter which showed PVCs. Patient is presenting today because she has noted suppressed TSH. Patient feels very anxious with sensation that she is jumping out of her skin. Seems to have difficulty focusing.    In the ED given abdominal pain she had a CTA Chest and CT A&P. There is no PE and no acute findings on abdomen and pelvis. She had s RUQ sono which showed cholelithiasis, but no signs of cholecystitis.     Complaining of headache now, though improved with Tylenol. (03 Oct 2024 10:11)    Hospital Course:  44F with no PMHx presenting for several weeks of feeling unwell, myalgia, weakness, anxiety, and palpitations. She has noted Holter monitor with possible PVCs (seen on EKG here). Outpatient labs showing hyperthyroidism -- suppressed TSH and elevated T3/T4. Patient admitted for further management. Per endo likely thyroiditis from recent viral infection.  Thyroid antibodies sent and thyroid US done. Patient to take beta blocker and NSAIDs for symptom management and follow up outpatient with PCP and endocrine.     HPI:  44F with no PMHx presenting for several weeks of feeling unwell, myalgia, and weakness. Patient states on approximately September 7, 2024 she had a sore throat. Since then began to feel generalized and very non-specific symptoms. She noted her hair falling out, possible changes to her skin? Also with diffuse body aches of which is notable in her abdomen. Feeling more weak and tired despite being active. No new medications. She states because she felt unwell she went to "[employer] who prescribed her Augmentin for presumed strep infection? She finished 5 days. She continued not to feel well and went to her PCP. Given palpitations she was placed on a Holter which showed PVCs. Patient is presenting today because she has noted suppressed TSH. Patient feels very anxious with sensation that she is jumping out of her skin. Seems to have difficulty focusing.    In the ED given abdominal pain she had a CTA Chest and CT A&P. There is no PE and no acute findings on abdomen and pelvis. She had s RUQ sono which showed cholelithiasis, but no signs of cholecystitis.     Complaining of headache now, though improved with Tylenol. (03 Oct 2024 10:11)    Hospital Course:  44F with no PMHx presenting for several weeks of feeling unwell, myalgia, weakness, anxiety, and palpitations. She has noted Holter monitor with possible PVCs (seen on EKG here). Outpatient labs showing hyperthyroidism -- suppressed TSH and elevated T3/T4. Patient admitted for further management. Per endo likely thyroiditis from recent viral infection.  Thyroid antibodies sent and thyroid US done. Patient to take beta blocker and NSAIDs for symptom management and follow up outpatient with PCP and endocrine. Will also start steroid taper.     HPI:  44F with no PMHx presenting for several weeks of feeling unwell, myalgia, and weakness. Patient states on approximately September 7, 2024 she had a sore throat. Since then began to feel generalized and very non-specific symptoms. She noted her hair falling out, possible changes to her skin? Also with diffuse body aches of which is notable in her abdomen. Feeling more weak and tired despite being active. No new medications. She states because she felt unwell she went to [employer] who prescribed her Augmentin for presumed strep infection? She finished 5 days. She continued not to feel well and went to her PCP. Given palpitations she was placed on a Holter which showed PVCs. Patient is presenting today because she has noted suppressed TSH. Patient feels very anxious with sensation that she is jumping out of her skin. Seems to have difficulty focusing.    In the ED given abdominal pain she had a CTA Chest and CT A&P. There is no PE and no acute findings on abdomen and pelvis. She had s RUQ sono which showed cholelithiasis, but no signs of cholecystitis.     Complaining of headache now, though improved with Tylenol. (03 Oct 2024 10:11)    Hospital Course:  44F with no PMHx presenting for several weeks of feeling unwell, myalgia, weakness, anxiety, and palpitations. She has noted Holter monitor with possible PVCs (seen on EKG here). Outpatient labs showing hyperthyroidism -- suppressed TSH and elevated T3/T4. Patient admitted for further management. Per endo likely thyroiditis from recent viral infection.  Thyroid antibodies sent and thyroid US done. Patient to take beta blocker and NSAIDs for symptom management and follow up outpatient with PCP and endocrine. Will also start steroid taper.    Important Medication Changes and Reason:  Please see medication reconciliation.    Active or Pending Issues Requiring Follow-up:  Please follow up with PCP/cardio and endo    Advanced Directives:   [X] Full code  [ ] DNR  [ ] Hospice    Discharge Diagnoses:  Thyrotoxicosis  PVC

## 2024-10-04 NOTE — DISCHARGE NOTE PROVIDER - NSDCCPCAREPLAN_GEN_ALL_CORE_FT
PRINCIPAL DISCHARGE DIAGNOSIS  Diagnosis: Thyrotoxicosis  Assessment and Plan of Treatment: You were seen by endocrine. Please take the beta blocker and NSAIDs as directed. Follow up with endocrine outpatient. We also performed a thyroid US and sent antibodies which your endocrinologist can follow up on      SECONDARY DISCHARGE DIAGNOSES  Diagnosis: PVC (pulmonary venous congestion)  Assessment and Plan of Treatment: Follow up outpatient with your PCP/cardiologist.    Diagnosis: Normocytic anemia  Assessment and Plan of Treatment: Follow up outpatient with your PCP     PRINCIPAL DISCHARGE DIAGNOSIS  Diagnosis: Thyrotoxicosis  Assessment and Plan of Treatment: You were seen by endocrine. Please take the beta blocker and NSAIDs as directed. Follow up with endocrine outpatient. We also performed a thyroid US and sent antibodies which your endocrinologist can follow up on. Follow instructions for steroid taper.      SECONDARY DISCHARGE DIAGNOSES  Diagnosis: PVC (pulmonary venous congestion)  Assessment and Plan of Treatment: Follow up outpatient with your PCP/cardiologist.    Diagnosis: Normocytic anemia  Assessment and Plan of Treatment: Follow up outpatient with your PCP     PRINCIPAL DISCHARGE DIAGNOSIS  Diagnosis: Thyrotoxicosis  Assessment and Plan of Treatment: You were seen by endocrine. Please take the beta blocker and NSAIDs as directed. Follow up with endocrine outpatient, as well as primary care. We also performed a thyroid US and sent antibodies which your endocrinologist can follow up on. Follow instructions for steroid taper.  Please seek medical attention promptly for any new or worsening symptoms.      SECONDARY DISCHARGE DIAGNOSES  Diagnosis: PVC (pulmonary venous congestion)  Assessment and Plan of Treatment: Follow up outpatient with your PCP/cardiologist.    Diagnosis: Normocytic anemia  Assessment and Plan of Treatment: Follow up outpatient with your PCP

## 2024-10-04 NOTE — DISCHARGE NOTE PROVIDER - CARE PROVIDER_API CALL
Joe So  Endocrinology/Metab/Diabetes  [address], [address]  [place] [address]  Phone: [phone]  Fax: [phone]  Established Patient  Follow Up Time: 1 week    Valentin Sarmiento  Cardiology  [address], [address]  [place] [address]  Phone: [phone]  Fax: [phone]  Established Patient  Follow Up Time: Routine   Valentin Sarmiento  Cardiology  [address], [address]  [place] [address]  Phone: [phone]  Fax: [phone]  Established Patient  Follow Up Time: Routine    Kalee Galindo  Endocrinology/Metab/Diabetes  [address], [address]  [place] [address]  Phone: [phone]  Fax: [phone]  Established Patient  Follow Up Time: 1 week

## 2024-10-05 ENCOUNTER — TRANSCRIPTION ENCOUNTER (OUTPATIENT)
Age: 44
End: 2024-10-05

## 2024-10-05 VITALS
HEART RATE: 66 BPM | RESPIRATION RATE: 18 BRPM | DIASTOLIC BLOOD PRESSURE: 64 MMHG | TEMPERATURE: 98 F | SYSTOLIC BLOOD PRESSURE: 115 MMHG | OXYGEN SATURATION: 96 %

## 2024-10-05 LAB — B BURGDOR DNA SPEC QL NAA+PROBE: NEGATIVE

## 2024-10-05 PROCEDURE — 84132 ASSAY OF SERUM POTASSIUM: CPT

## 2024-10-05 PROCEDURE — 71046 X-RAY EXAM CHEST 2 VIEWS: CPT

## 2024-10-05 PROCEDURE — 84484 ASSAY OF TROPONIN QUANT: CPT

## 2024-10-05 PROCEDURE — 99232 SBSQ HOSP IP/OBS MODERATE 35: CPT

## 2024-10-05 PROCEDURE — 83735 ASSAY OF MAGNESIUM: CPT

## 2024-10-05 PROCEDURE — 83605 ASSAY OF LACTIC ACID: CPT

## 2024-10-05 PROCEDURE — 82553 CREATINE MB FRACTION: CPT

## 2024-10-05 PROCEDURE — 84702 CHORIONIC GONADOTROPIN TEST: CPT

## 2024-10-05 PROCEDURE — 85730 THROMBOPLASTIN TIME PARTIAL: CPT

## 2024-10-05 PROCEDURE — 80053 COMPREHEN METABOLIC PANEL: CPT

## 2024-10-05 PROCEDURE — 82550 ASSAY OF CK (CPK): CPT

## 2024-10-05 PROCEDURE — 84295 ASSAY OF SERUM SODIUM: CPT

## 2024-10-05 PROCEDURE — 84478 ASSAY OF TRIGLYCERIDES: CPT

## 2024-10-05 PROCEDURE — 82947 ASSAY GLUCOSE BLOOD QUANT: CPT

## 2024-10-05 PROCEDURE — 76536 US EXAM OF HEAD AND NECK: CPT

## 2024-10-05 PROCEDURE — 82803 BLOOD GASES ANY COMBINATION: CPT

## 2024-10-05 PROCEDURE — 86376 MICROSOMAL ANTIBODY EACH: CPT

## 2024-10-05 PROCEDURE — 82435 ASSAY OF BLOOD CHLORIDE: CPT

## 2024-10-05 PROCEDURE — 85610 PROTHROMBIN TIME: CPT

## 2024-10-05 PROCEDURE — 76705 ECHO EXAM OF ABDOMEN: CPT

## 2024-10-05 PROCEDURE — 0225U NFCT DS DNA&RNA 21 SARSCOV2: CPT

## 2024-10-05 PROCEDURE — 84443 ASSAY THYROID STIM HORMONE: CPT

## 2024-10-05 PROCEDURE — 85018 HEMOGLOBIN: CPT

## 2024-10-05 PROCEDURE — 84439 ASSAY OF FREE THYROXINE: CPT

## 2024-10-05 PROCEDURE — 84445 ASSAY OF TSI GLOBULIN: CPT

## 2024-10-05 PROCEDURE — 74177 CT ABD & PELVIS W/CONTRAST: CPT | Mod: MC

## 2024-10-05 PROCEDURE — 84436 ASSAY OF TOTAL THYROXINE: CPT

## 2024-10-05 PROCEDURE — 85014 HEMATOCRIT: CPT

## 2024-10-05 PROCEDURE — 83690 ASSAY OF LIPASE: CPT

## 2024-10-05 PROCEDURE — 99285 EMERGENCY DEPT VISIT HI MDM: CPT | Mod: 25

## 2024-10-05 PROCEDURE — 81001 URINALYSIS AUTO W/SCOPE: CPT

## 2024-10-05 PROCEDURE — 71275 CT ANGIOGRAPHY CHEST: CPT | Mod: MC

## 2024-10-05 PROCEDURE — 85025 COMPLETE CBC W/AUTO DIFF WBC: CPT

## 2024-10-05 PROCEDURE — 96374 THER/PROPH/DIAG INJ IV PUSH: CPT

## 2024-10-05 PROCEDURE — 83520 IMMUNOASSAY QUANT NOS NONAB: CPT

## 2024-10-05 PROCEDURE — 84480 ASSAY TRIIODOTHYRONINE (T3): CPT

## 2024-10-05 PROCEDURE — 96375 TX/PRO/DX INJ NEW DRUG ADDON: CPT

## 2024-10-05 PROCEDURE — 82330 ASSAY OF CALCIUM: CPT

## 2024-10-05 RX ORDER — KETOROLAC TROMETHAMINE 10 MG/1
30 TABLET, FILM COATED ORAL EVERY 6 HOURS
Refills: 0 | Status: DISCONTINUED | OUTPATIENT
Start: 2024-10-05 | End: 2024-10-05

## 2024-10-05 RX ORDER — PREDNISONE 5 MG/1
40 TABLET ORAL
Refills: 0 | Status: DISCONTINUED | OUTPATIENT
Start: 2024-10-05 | End: 2024-10-05

## 2024-10-05 RX ORDER — OXYCODONE AND ACETAMINOPHEN 5; 325 MG/1; MG/1
1 TABLET ORAL
Qty: 42 | Refills: 0
Start: 2024-10-05 | End: 2024-10-11

## 2024-10-05 RX ORDER — PANTOPRAZOLE SODIUM 40 MG/1
1 TABLET, DELAYED RELEASE ORAL
Qty: 90 | Refills: 0
Start: 2024-10-05 | End: 2025-01-02

## 2024-10-05 RX ORDER — INFLUENZA VIRUS VACCINE 15; 15; 15; 15 UG/.5ML; UG/.5ML; UG/.5ML; UG/.5ML
0.5 SUSPENSION INTRAMUSCULAR ONCE
Refills: 0 | Status: DISCONTINUED | OUTPATIENT
Start: 2024-10-05 | End: 2024-10-05

## 2024-10-05 RX ADMIN — PREDNISONE 40 MILLIGRAM(S): 5 TABLET ORAL at 08:20

## 2024-10-05 RX ADMIN — PANTOPRAZOLE SODIUM 40 MILLIGRAM(S): 40 TABLET, DELAYED RELEASE ORAL at 06:34

## 2024-10-05 RX ADMIN — ATENOLOL 25 MILLIGRAM(S): 25 TABLET ORAL at 06:34

## 2024-10-05 NOTE — DISCHARGE NOTE NURSING/CASE MANAGEMENT/SOCIAL WORK - PATIENT PORTAL LINK FT
You can access the FollowMyHealth Patient Portal offered by [facility] by registering at the following website: [email or web address]. By joining [facility]’s FollowMyHealth portal, you will also be able to view your health information using other applications (apps) compatible with our system.

## 2024-10-05 NOTE — CHART NOTE - NSCHARTNOTEFT_GEN_A_CORE
Writer received the following report and discussed with attending Dr Franklin. IMPRESSION:  1. Mildly enlarged, diffusely heterogeneous, lobulated in contour thyroid gland with mildly increased vascularity. There are multifocal, patchy regions of decreased echogenicity throughout both thyroid lobes. The constellation of findings is most suggestive of thyroiditis, possibly subacute thyroiditis. Advise correlation with clinical history and laboratory values.    2. Colloid cysts in the bilateral thyroid lobes measuring up to 3 mm. Otherwise, no focal, discrete nodule is delineated.    3. Mildly prominent bilateral cervical lymph nodes with normal morphology, likely reactive in nature.    Close, short-term follow-up ultrasound in 2 to 3 months, or sooner depending on patient's symptoms, is advised to evaluate for resolution of these findings as underlying thyroid nodules could not be excluded at this time.    These findings were discussed with EDILBERTO Beavers by Dr. Quinn on 10/4/2024 at 3:46 PM.    --- End of Report ---    Plan : c/w NSAIDS atc and tenormin          endo follow up           f/up ] TSH receptor antibodies, TSI, TPO in lab, pending
Patient called to discuss her current case/care.    Thyroid US consistent with thyroiditis. FT4 elevated. History also consistent with thyroiditis. Discussed with endocrine. May not have much benefit for steroids at this juncture. They are not recommending a pharmacological anti-thyroid measure. Currently on atenolol & NSAIDs (with GI PPx).    She has a severe headache a/w nausea and vomiting. Some photophobia and phonophobia. No focal deficits. Pain is being relieved with Percocet, minimal affect with just Tylenol and somewhat improved with Toradol. Likely related to thyrotoxicosis. Symptom management for now, will monitor neurologically.    Will follow up endocrine recommendations.
Request from Dr. Skaggs to facilitate patient discharge. Medication reconciliation reviewed, revised, and resolved with Dr. Skaggs, who has medically cleared patient for discharge with follow up as advised. Please refer to discharge note for detailed hospital course.

## 2024-10-05 NOTE — PROGRESS NOTE ADULT - PROBLEM SELECTOR PLAN 1
- Thyrotoxicosis 2/2 to thyroiditis from viral infection  - House endocrine consulted  - Check thyroid antibodies (so far neg)  - Thyroid US consistent with thyroiditis. Outpatient follow up for possible repeat  - Start Atenolol (instead of propranolol for ease of dosing)  - Standing NSAIDS  - GI PPx with either H2 Blocker or PPI (patient to decide)  - Discussed with endocrine -- start Prednisone 30 mg, taper to be determined  - Patient to follow up outpatient with Sar[facility] webster) - Thyrotoxicosis 2/2 to thyroiditis from viral infection  - House endocrine consulted  - Check thyroid antibodies (so far neg)  - Thyroid US consistent with thyroiditis. Outpatient follow up for possible repeat  - Start Atenolol (instead of propranolol for ease of dosing)  - Standing NSAIDS  - GI PPx with either H2 Blocker or PPI (patient to decide)  - Discussed with endocrine -- start Prednisone 30 mg, taper to be determined  - Discussed with patient, will obtain 2nd opinion prior to DC  - Patient to follow up outpatient with Saray webster) - Thyrotoxicosis 2/2 to thyroiditis from viral infection  - House endocrine consulted  - Check thyroid antibodies (so far neg)  - Thyroid US consistent with thyroiditis. Outpatient follow up for possible repeat  - Start Atenolol (instead of propranolol for ease of dosing)  - NSAIDS PRN  - GI PPx with either H2 Blocker or PPI (patient to decide)  - Discussed with endocrine -- start Prednisone 40 mg and taper by 10 mg every 5 days  - Discussed with patient, obtained 2nd endocrine opinion  - Patient to follow up outpatient with endo this week [employer], 11th [employer]

## 2024-10-05 NOTE — DISCHARGE NOTE NURSING/CASE MANAGEMENT/SOCIAL WORK - NSDCVIVACCINE_GEN_ALL_CORE_FT
influenza, unspecified formulation [inactive]; 17-Oct-2012 21:53; Hawa Gamble (RN); [employer]; [id number] (Exp. Date: 30-Jun-2013); IM; LArm; 0.5 cc;

## 2024-10-05 NOTE — PROGRESS NOTE ADULT - PROBLEM SELECTOR PLAN 3
- Baseline ~12  - No signs of bleeding  - OP follow up
- Baseline ~12  - No signs of bleeding  - Continue to monitor

## 2024-10-05 NOTE — CONSULT NOTE ADULT - ASSESSMENT
44y old Female with no chronic medical problems presenting with acute thyroiditis.    1. Subacute thyroiditis  2. Hyperthyroidism  - The patient has a classic case of subacute thyroiditis which is unfortunately severe. She has not responded adequately to traditional treatment with NSAIDS and beta blocker so I do agree with starting steroids.  - Recommend Prednisone 40 mg daily for 5 days followed by a taper of 10 mg every 5 days  - She should start to notice improvement in her symptoms in 24-48 hours  - No indication for methimazole at this time  - Would stop NSAIDS due to risk for ulcers with steroids and no benefit seen  - Continue Atenolol 25 mg daily to help with adrenergic symptoms, can taper off outpatient  - Will follow up TSI AB however very low suspicion for Graves' disease at this time   - Will arrange to see her outpatient end of this week    Kalee Galindo MD  [facility] Division of Endocrinology    [address] [place] [address]    T [phone]  F [phone]

## 2024-10-05 NOTE — PROGRESS NOTE ADULT - PROBLEM SELECTOR PLAN 2
- Has Holter monitor placed prior to admission with EKG showing PVCs. She has palpitations  - Cardiology consulted, likely due to thyrotoxicosis  - TTE from 10/1 done as outpatient reviewed and unremarkable  - D/C Tele -- LOW BURDEN
- Has Holter monitor placed prior to admission with EKG showing PVCs. She has palpitations  - Monitor on telemetry for PVC burden  - Cardiology consulted, likely due to thyrotoxicosis  - TTE from 10/1 done as outpatient reviewed and unremarkable

## 2024-10-05 NOTE — PROGRESS NOTE ADULT - SUBJECTIVE AND OBJECTIVE BOX
Patient is a 44y old  Female who presents with a chief complaint of Anxiety/Pain (05 Oct 2024 06:32)      SUBJECTIVE / OVERNIGHT EVENTS: Patient seen and examined at bedside. No acute events overnight. Headache better. Feels overall improved.    MEDICATIONS  (STANDING):  atenolol  Tablet 25 milliGRAM(s) Oral daily  influenza   Vaccine 0.5 milliLiter(s) IntraMuscular once  melatonin 5 milliGRAM(s) Oral at bedtime  pantoprazole    Tablet 40 milliGRAM(s) Oral before breakfast  predniSONE   Tablet 40 milliGRAM(s) Oral <User Schedule>    MEDICATIONS  (PRN):  ketorolac   Injectable 30 milliGRAM(s) IV Push every 6 hours PRN Severe Pain (7 - 10)  ondansetron Injectable 4 milliGRAM(s) IV Push every 8 hours PRN Nausea and/or Vomiting  oxycodone    5 mG/acetaminophen 325 mG 1 Tablet(s) Oral every 4 hours PRN Mild Pain (1 - 3)      CAPILLARY BLOOD GLUCOSE        I&O's Summary      PHYSICAL EXAM:  Vital Signs Last 24 Hrs  T(C): 36.7 (05 Oct 2024 06:41), Max: 36.7 (05 Oct 2024 06:41)  T(F): 98.1 (05 Oct 2024 06:41), Max: 98.1 (05 Oct 2024 06:41)  HR: 73 (05 Oct 2024 06:41) (69 - 73)  BP: 117/75 (05 Oct 2024 06:41) (114/73 - 117/75)  BP(mean): --  RR: 18 (05 Oct 2024 06:41) (18 - 18)  SpO2: 96% (05 Oct 2024 06:41) (96% - 97%)    Parameters below as of 05 Oct 2024 06:41  Patient On (Oxygen Delivery Method): room air      LABS:                      RADIOLOGY & ADDITIONAL TESTS:  Results Reviewed:   Imaging Personally Reviewed:  Electrocardiogram Personally Reviewed:    COORDINATION OF CARE:  Care Discussed with Consultants/Other Providers [Y/N]:  Prior or Outpatient Records Reviewed [Y/N]:

## 2024-10-05 NOTE — CONSULT NOTE ADULT - SUBJECTIVE AND OBJECTIVE BOX
[facility] Initial Consult Note    HPI  44y old Female with no chronic medical problems presenting with acute thyroiditis.    History was obtained from patient and chart review.     Starting around 9/7/2024 she had a sore throat for about 5-6 days after which her symptoms progressed to chills, severe muscle aches, dizziness, rigors, low-grade temperature, palpitations, diaphoresis, tremors, anxiety, insomnia leg weakness and 6 lb weight loss in the last month. She went to [place] and was given Augmentin which didn't help her. Had mild neck pain which has mostly resolved. During this time she was taking Motrin 600-800 mg which is the only thing that was helping somewhat. She then fell and wasn't able to get up so she came to the hospital. She's reporting symptoms of psychosis as well, nausea and headaches. She saw her PCP- Dr. Sarmiento- who checked her labs and she was noted to be hyperthyroid. Due to worsening symptoms, she came to the hospital.     She was started on NSAIDS and Atenolol and seen by [facility] endocrine. Her TFT's on 10/3 show TSH <0.01, TT3 292, FT4 4.9. TPO Ab came back normal at 21 (<34) and TRAB are negative with TSI Ab. Thyroid US done showing diffuse heterogeneity with mildly increased vascularity along with some reactive cervical lymph nodes. She doesn't feel the Toradol is helping but Percocet is. She has no chronic medical issues, no personal history of thyroid disease. Her father has amiodarone induced hypothyroidism. She is taking Vitamin C and Nutrafol at home.    Vital Signs Last 24 Hrs  T(C): 36.6 (10-04-24 @ 20:49), Max: 36.7 (10-04-24 @ 08:29)  HR: 69 (10-04-24 @ 20:49) (69 - 86)  BP: 114/73 (10-04-24 @ 20:49) (107/64 - 114/73)  RR: 18 (10-04-24 @ 20:49) (18 - 18)  SpO2: 97% (10-04-24 @ 20:49) (96% - 97%)    Physical Exam  Gen: NAD, alert, awake, anxious  HEENT: NC/AT, moist mucous membranes  Chest: normal respiratory effort  Heart: +S1 S2  Neuro: normal mood and affect    Medications  atenolol  Tablet 25 milliGRAM(s) Oral daily  ketorolac   Injectable 30 milliGRAM(s) IV Push every 6 hours  melatonin 5 milliGRAM(s) Oral at bedtime  ondansetron Injectable 4 milliGRAM(s) IV Push every 8 hours PRN  oxycodone    5 mG/acetaminophen 325 mG 1 Tablet(s) Oral every 4 hours PRN  pantoprazole    Tablet 40 milliGRAM(s) Oral before breakfast  predniSONE   Tablet 30 milliGRAM(s) Oral <User Schedule>    Pertinent labs/imaging

## 2024-10-06 LAB
A PHAGOCYTOPH IGG TITR SER IF: NORMAL
B BURGDOR AB SER QL IA: 0.34 IV
B MICROTI IGG TITR SER: NORMAL
E CHAFFEENSIS IGG TITR SER IF: NORMAL

## 2024-10-06 RX ORDER — PREDNISONE 5 MG/1
4 TABLET ORAL
Qty: 46 | Refills: 0
Start: 2024-10-06 | End: 2024-10-24

## 2024-10-07 LAB
BACTERIA BLD CULT: NORMAL
BACTERIA BLD CULT: NORMAL

## 2024-10-08 LAB — TSI ACT/NOR SER: <0.1 IU/L — SIGNIFICANT CHANGE UP (ref 0–0.55)

## 2024-10-11 ENCOUNTER — APPOINTMENT (OUTPATIENT)
Dept: INTERNAL MEDICINE | Facility: CLINIC | Age: 44
End: 2024-10-11
Payer: COMMERCIAL

## 2024-10-11 VITALS
TEMPERATURE: 98.1 F | OXYGEN SATURATION: 99 % | HEIGHT: 64 IN | WEIGHT: 141 LBS | HEART RATE: 82 BPM | SYSTOLIC BLOOD PRESSURE: 100 MMHG | DIASTOLIC BLOOD PRESSURE: 76 MMHG | BODY MASS INDEX: 24.07 KG/M2

## 2024-10-11 DIAGNOSIS — E06.9 THYROIDITIS, UNSPECIFIED: ICD-10-CM

## 2024-10-11 PROCEDURE — 99496 TRANSJ CARE MGMT HIGH F2F 7D: CPT

## 2024-10-11 PROCEDURE — 99213 OFFICE O/P EST LOW 20 MIN: CPT

## 2024-10-11 PROCEDURE — G2211 COMPLEX E/M VISIT ADD ON: CPT | Mod: NC

## 2024-10-14 LAB
CRP SERPL-MCNC: 34 MG/L
ERYTHROCYTE [SEDIMENTATION RATE] IN BLOOD BY WESTERGREN METHOD: 57 MM/HR
FERRITIN SERPL-MCNC: 301 NG/ML
HCT VFR BLD CALC: 39.6 %
HGB BLD-MCNC: 12.3 G/DL
IRON SATN MFR SERPL: 20 %
IRON SERPL-MCNC: 51 UG/DL
MCHC RBC-ENTMCNC: 28.5 PG
MCHC RBC-ENTMCNC: 31.1 GM/DL
MCV RBC AUTO: 91.9 FL
PLATELET # BLD AUTO: 380 K/UL
RBC # BLD: 4.31 M/UL
RBC # FLD: 11.7 %
T3FREE SERPL-MCNC: 4.71 PG/ML
T4 FREE SERPL-MCNC: 2.4 NG/DL
TIBC SERPL-MCNC: 252 UG/DL
TSH SERPL-ACNC: <0.01 UIU/ML
UIBC SERPL-MCNC: 201 UG/DL
WBC # FLD AUTO: 8.44 K/UL

## 2024-10-14 PROCEDURE — 93241 XTRNL ECG REC>48HR<7D: CPT

## 2024-10-29 ENCOUNTER — OUTPATIENT (OUTPATIENT)
Dept: OUTPATIENT SERVICES | Facility: HOSPITAL | Age: 44
LOS: 1 days | End: 2024-10-29
Payer: COMMERCIAL

## 2024-10-29 DIAGNOSIS — Z98.891 HISTORY OF UTERINE SCAR FROM PREVIOUS SURGERY: Chronic | ICD-10-CM

## 2024-10-29 DIAGNOSIS — E06.1 SUBACUTE THYROIDITIS: ICD-10-CM

## 2024-10-29 DIAGNOSIS — Z90.89 ACQUIRED ABSENCE OF OTHER ORGANS: Chronic | ICD-10-CM

## 2024-10-29 DIAGNOSIS — E05.90 THYROTOXICOSIS, UNSPECIFIED WITHOUT THYROTOXIC CRISIS OR STORM: ICD-10-CM

## 2024-10-29 PROBLEM — Z78.9 OTHER SPECIFIED HEALTH STATUS: Chronic | Status: ACTIVE | Noted: 2024-10-03

## 2024-10-29 PROCEDURE — 84439 ASSAY OF FREE THYROXINE: CPT

## 2024-10-29 PROCEDURE — 84443 ASSAY THYROID STIM HORMONE: CPT

## 2024-10-29 PROCEDURE — 36415 COLL VENOUS BLD VENIPUNCTURE: CPT

## 2024-10-29 PROCEDURE — 84480 ASSAY TRIIODOTHYRONINE (T3): CPT

## 2024-11-18 ENCOUNTER — OUTPATIENT (OUTPATIENT)
Dept: OUTPATIENT SERVICES | Facility: HOSPITAL | Age: 44
LOS: 1 days | End: 2024-11-18
Payer: COMMERCIAL

## 2024-11-18 DIAGNOSIS — R94.6 ABNORMAL RESULTS OF THYROID FUNCTION STUDIES: ICD-10-CM

## 2024-11-18 DIAGNOSIS — Z90.89 ACQUIRED ABSENCE OF OTHER ORGANS: Chronic | ICD-10-CM

## 2024-11-18 DIAGNOSIS — Z98.891 HISTORY OF UTERINE SCAR FROM PREVIOUS SURGERY: Chronic | ICD-10-CM

## 2024-11-18 LAB
T3FREE SERPL-MCNC: 2.47 PG/ML — SIGNIFICANT CHANGE UP (ref 2–4.4)
TSH SERPL-MCNC: 3.51 UIU/ML — SIGNIFICANT CHANGE UP (ref 0.36–3.74)

## 2024-11-18 PROCEDURE — 84439 ASSAY OF FREE THYROXINE: CPT

## 2024-11-18 PROCEDURE — 36415 COLL VENOUS BLD VENIPUNCTURE: CPT

## 2024-11-18 PROCEDURE — 84481 FREE ASSAY (FT-3): CPT

## 2024-11-18 PROCEDURE — 84443 ASSAY THYROID STIM HORMONE: CPT

## 2024-11-19 LAB — T4 FREE SERPL-MCNC: 1 NG/DL — SIGNIFICANT CHANGE UP (ref 0.9–1.8)

## 2025-01-10 ENCOUNTER — OUTPATIENT (OUTPATIENT)
Dept: OUTPATIENT SERVICES | Facility: HOSPITAL | Age: 45
LOS: 1 days | End: 2025-01-10
Payer: COMMERCIAL

## 2025-01-10 DIAGNOSIS — Z98.891 HISTORY OF UTERINE SCAR FROM PREVIOUS SURGERY: Chronic | ICD-10-CM

## 2025-01-10 DIAGNOSIS — Z90.89 ACQUIRED ABSENCE OF OTHER ORGANS: Chronic | ICD-10-CM

## 2025-01-10 DIAGNOSIS — E06.9 THYROIDITIS, UNSPECIFIED: ICD-10-CM

## 2025-01-10 LAB
CRP SERPL-MCNC: <3 MG/L — SIGNIFICANT CHANGE UP
T3FREE SERPL-MCNC: 2.3 PG/ML — SIGNIFICANT CHANGE UP (ref 2–4.4)
T4 FREE SERPL-MCNC: 1.3 NG/DL — SIGNIFICANT CHANGE UP (ref 0.9–1.7)
TSH SERPL-MCNC: 3.22 UIU/ML — SIGNIFICANT CHANGE UP (ref 0.36–3.74)

## 2025-01-10 PROCEDURE — 84481 FREE ASSAY (FT-3): CPT

## 2025-01-10 PROCEDURE — 82672 ASSAY OF ESTROGEN: CPT

## 2025-01-10 PROCEDURE — 84439 ASSAY OF FREE THYROXINE: CPT

## 2025-01-10 PROCEDURE — 85652 RBC SED RATE AUTOMATED: CPT

## 2025-01-10 PROCEDURE — 86140 C-REACTIVE PROTEIN: CPT

## 2025-01-10 PROCEDURE — 36415 COLL VENOUS BLD VENIPUNCTURE: CPT

## 2025-01-10 PROCEDURE — 84443 ASSAY THYROID STIM HORMONE: CPT

## 2025-01-11 LAB — ERYTHROCYTE [SEDIMENTATION RATE] IN BLOOD: 14 MM/HR — SIGNIFICANT CHANGE UP (ref 0–15)

## 2025-01-13 LAB — ESTROGEN SERPL-MCNC: 192 PG/ML — SIGNIFICANT CHANGE UP

## 2025-01-20 ENCOUNTER — APPOINTMENT (OUTPATIENT)
Dept: MAMMOGRAPHY | Facility: IMAGING CENTER | Age: 45
End: 2025-01-20

## 2025-01-20 ENCOUNTER — APPOINTMENT (OUTPATIENT)
Dept: ULTRASOUND IMAGING | Facility: IMAGING CENTER | Age: 45
End: 2025-01-20

## 2025-02-27 ENCOUNTER — OUTPATIENT (OUTPATIENT)
Dept: OUTPATIENT SERVICES | Facility: HOSPITAL | Age: 45
LOS: 1 days | End: 2025-02-27

## 2025-02-27 DIAGNOSIS — Z90.89 ACQUIRED ABSENCE OF OTHER ORGANS: Chronic | ICD-10-CM

## 2025-02-27 DIAGNOSIS — Z00.00 ENCOUNTER FOR GENERAL ADULT MEDICAL EXAMINATION WITHOUT ABNORMAL FINDINGS: ICD-10-CM

## 2025-02-27 DIAGNOSIS — Z98.891 HISTORY OF UTERINE SCAR FROM PREVIOUS SURGERY: Chronic | ICD-10-CM

## 2025-02-28 LAB
CRP SERPL-MCNC: <3 MG/L — SIGNIFICANT CHANGE UP
ERYTHROCYTE [SEDIMENTATION RATE] IN BLOOD: 27 MM/HR — HIGH (ref 0–15)
T3FREE SERPL-MCNC: 2.48 PG/ML — SIGNIFICANT CHANGE UP (ref 2–4.4)
T4 FREE SERPL-MCNC: 1.2 NG/DL — SIGNIFICANT CHANGE UP (ref 0.9–1.7)
TSH SERPL-MCNC: 2.14 UIU/ML — SIGNIFICANT CHANGE UP (ref 0.36–3.74)

## 2025-02-28 PROCEDURE — 84481 FREE ASSAY (FT-3): CPT

## 2025-02-28 PROCEDURE — 86140 C-REACTIVE PROTEIN: CPT

## 2025-02-28 PROCEDURE — 36415 COLL VENOUS BLD VENIPUNCTURE: CPT

## 2025-02-28 PROCEDURE — 84443 ASSAY THYROID STIM HORMONE: CPT

## 2025-02-28 PROCEDURE — 85652 RBC SED RATE AUTOMATED: CPT

## 2025-02-28 PROCEDURE — 84439 ASSAY OF FREE THYROXINE: CPT

## 2025-03-03 ENCOUNTER — APPOINTMENT (OUTPATIENT)
Dept: ULTRASOUND IMAGING | Facility: CLINIC | Age: 45
End: 2025-03-03

## 2025-03-03 ENCOUNTER — APPOINTMENT (OUTPATIENT)
Dept: MAMMOGRAPHY | Facility: CLINIC | Age: 45
End: 2025-03-03

## 2025-03-15 ENCOUNTER — APPOINTMENT (OUTPATIENT)
Dept: MAMMOGRAPHY | Facility: CLINIC | Age: 45
End: 2025-03-15
Payer: COMMERCIAL

## 2025-03-15 ENCOUNTER — OUTPATIENT (OUTPATIENT)
Dept: OUTPATIENT SERVICES | Facility: HOSPITAL | Age: 45
LOS: 1 days | End: 2025-03-15
Payer: COMMERCIAL

## 2025-03-15 ENCOUNTER — APPOINTMENT (OUTPATIENT)
Dept: ULTRASOUND IMAGING | Facility: CLINIC | Age: 45
End: 2025-03-15
Payer: COMMERCIAL

## 2025-03-15 DIAGNOSIS — Z90.89 ACQUIRED ABSENCE OF OTHER ORGANS: Chronic | ICD-10-CM

## 2025-03-15 DIAGNOSIS — Z00.8 ENCOUNTER FOR OTHER GENERAL EXAMINATION: ICD-10-CM

## 2025-03-15 DIAGNOSIS — D24.9 BENIGN NEOPLASM OF UNSPECIFIED BREAST: ICD-10-CM

## 2025-03-15 DIAGNOSIS — Z12.31 ENCOUNTER FOR SCREENING MAMMOGRAM FOR MALIGNANT NEOPLASM OF BREAST: ICD-10-CM

## 2025-03-15 DIAGNOSIS — Z98.891 HISTORY OF UTERINE SCAR FROM PREVIOUS SURGERY: Chronic | ICD-10-CM

## 2025-03-15 PROCEDURE — 76641 ULTRASOUND BREAST COMPLETE: CPT | Mod: 26,50

## 2025-03-15 PROCEDURE — 77067 SCR MAMMO BI INCL CAD: CPT

## 2025-03-15 PROCEDURE — 77067 SCR MAMMO BI INCL CAD: CPT | Mod: 26

## 2025-03-15 PROCEDURE — 77063 BREAST TOMOSYNTHESIS BI: CPT | Mod: 26

## 2025-03-15 PROCEDURE — 76641 ULTRASOUND BREAST COMPLETE: CPT

## 2025-03-15 PROCEDURE — 77063 BREAST TOMOSYNTHESIS BI: CPT

## 2025-04-29 ENCOUNTER — APPOINTMENT (OUTPATIENT)
Dept: UROGYNECOLOGY | Facility: CLINIC | Age: 45
End: 2025-04-29

## 2025-08-14 ENCOUNTER — OUTPATIENT (OUTPATIENT)
Dept: OUTPATIENT SERVICES | Facility: HOSPITAL | Age: 45
LOS: 1 days | End: 2025-08-14
Payer: COMMERCIAL

## 2025-08-14 DIAGNOSIS — E03.9 HYPOTHYROIDISM, UNSPECIFIED: ICD-10-CM

## 2025-08-14 DIAGNOSIS — Z98.891 HISTORY OF UTERINE SCAR FROM PREVIOUS SURGERY: Chronic | ICD-10-CM

## 2025-08-14 DIAGNOSIS — Z90.89 ACQUIRED ABSENCE OF OTHER ORGANS: Chronic | ICD-10-CM

## 2025-08-14 LAB
A1C WITH ESTIMATED AVERAGE GLUCOSE RESULT: 4.9 % — SIGNIFICANT CHANGE UP (ref 4–5.6)
ANION GAP SERPL CALC-SCNC: 9 MMOL/L — SIGNIFICANT CHANGE UP (ref 5–17)
BUN SERPL-MCNC: 21 MG/DL — SIGNIFICANT CHANGE UP (ref 7–23)
CALCIUM SERPL-MCNC: 9.3 MG/DL — SIGNIFICANT CHANGE UP (ref 8.4–10.5)
CHLORIDE SERPL-SCNC: 103 MMOL/L — SIGNIFICANT CHANGE UP (ref 96–108)
CHOLEST SERPL-MCNC: 197 MG/DL — SIGNIFICANT CHANGE UP
CO2 SERPL-SCNC: 27 MMOL/L — SIGNIFICANT CHANGE UP (ref 22–31)
CREAT SERPL-MCNC: 0.95 MG/DL — SIGNIFICANT CHANGE UP (ref 0.5–1.3)
CRP SERPL-MCNC: <3 MG/L — SIGNIFICANT CHANGE UP
EGFR: 75 ML/MIN/1.73M2 — SIGNIFICANT CHANGE UP
EGFR: 75 ML/MIN/1.73M2 — SIGNIFICANT CHANGE UP
ERYTHROCYTE [SEDIMENTATION RATE] IN BLOOD: 14 MM/HR — SIGNIFICANT CHANGE UP (ref 0–20)
ESTIMATED AVERAGE GLUCOSE: 94 MG/DL — SIGNIFICANT CHANGE UP (ref 68–114)
GLUCOSE SERPL-MCNC: 90 MG/DL — SIGNIFICANT CHANGE UP (ref 70–99)
HDLC SERPL-MCNC: 52 MG/DL — SIGNIFICANT CHANGE UP
LDLC SERPL-MCNC: 133 MG/DL — HIGH
LIPID PNL WITH DIRECT LDL SERPL: 133 MG/DL — HIGH
NONHDLC SERPL-MCNC: 144 MG/DL — HIGH
POTASSIUM SERPL-MCNC: 4.1 MMOL/L — SIGNIFICANT CHANGE UP (ref 3.5–5.3)
POTASSIUM SERPL-SCNC: 4.1 MMOL/L — SIGNIFICANT CHANGE UP (ref 3.5–5.3)
SODIUM SERPL-SCNC: 139 MMOL/L — SIGNIFICANT CHANGE UP (ref 135–145)
T3FREE SERPL-MCNC: 2.66 PG/ML — SIGNIFICANT CHANGE UP (ref 2–4.4)
T4 FREE SERPL-MCNC: 1.4 NG/DL — SIGNIFICANT CHANGE UP (ref 0.9–1.8)
TRIGL SERPL-MCNC: 62 MG/DL — SIGNIFICANT CHANGE UP
TSH SERPL-MCNC: 2.29 UIU/ML — SIGNIFICANT CHANGE UP (ref 0.36–3.74)

## 2025-08-14 PROCEDURE — 84481 FREE ASSAY (FT-3): CPT

## 2025-08-14 PROCEDURE — 85652 RBC SED RATE AUTOMATED: CPT

## 2025-08-14 PROCEDURE — 80061 LIPID PANEL: CPT

## 2025-08-14 PROCEDURE — 86140 C-REACTIVE PROTEIN: CPT

## 2025-08-14 PROCEDURE — 84443 ASSAY THYROID STIM HORMONE: CPT

## 2025-08-14 PROCEDURE — 84439 ASSAY OF FREE THYROXINE: CPT

## 2025-08-14 PROCEDURE — 36415 COLL VENOUS BLD VENIPUNCTURE: CPT

## 2025-08-14 PROCEDURE — 80048 BASIC METABOLIC PNL TOTAL CA: CPT

## 2025-08-14 PROCEDURE — 83036 HEMOGLOBIN GLYCOSYLATED A1C: CPT

## 2025-08-22 ENCOUNTER — APPOINTMENT (OUTPATIENT)
Dept: GASTROENTEROLOGY | Facility: CLINIC | Age: 45
End: 2025-08-22
Payer: COMMERCIAL

## 2025-08-22 DIAGNOSIS — R10.13 EPIGASTRIC PAIN: ICD-10-CM

## 2025-08-22 DIAGNOSIS — R12 HEARTBURN: ICD-10-CM

## 2025-08-22 DIAGNOSIS — Z12.11 ENCOUNTER FOR SCREENING FOR MALIGNANT NEOPLASM OF COLON: ICD-10-CM

## 2025-08-22 PROCEDURE — 99204 OFFICE O/P NEW MOD 45 MIN: CPT | Mod: 95

## 2025-08-22 RX ORDER — SODIUM SULFATE, POTASSIUM SULFATE AND MAGNESIUM SULFATE 1.6; 3.13; 17.5 G/177ML; G/177ML; G/177ML
17.5-3.13-1.6 SOLUTION ORAL
Qty: 1 | Refills: 0 | Status: ACTIVE | COMMUNITY
Start: 2025-08-22 | End: 1900-01-01

## 2025-08-22 RX ORDER — PSYLLIUM HUSK 0.4 G
CAPSULE ORAL
Refills: 0 | Status: ACTIVE | COMMUNITY

## 2025-08-22 RX ORDER — MULTIVIT-MIN/IRON/FOLIC ACID/K 18-600-40
CAPSULE ORAL
Refills: 0 | Status: ACTIVE | COMMUNITY

## 2025-08-22 RX ORDER — SENNOSIDES 8.6 MG
TABLET ORAL
Refills: 0 | Status: ACTIVE | COMMUNITY